# Patient Record
Sex: MALE | Race: WHITE | Employment: FULL TIME | URBAN - METROPOLITAN AREA
[De-identification: names, ages, dates, MRNs, and addresses within clinical notes are randomized per-mention and may not be internally consistent; named-entity substitution may affect disease eponyms.]

---

## 2018-10-25 ENCOUNTER — OFFICE VISIT (OUTPATIENT)
Dept: ENDOCRINOLOGY | Age: 48
End: 2018-10-25
Payer: COMMERCIAL

## 2018-10-25 VITALS
DIASTOLIC BLOOD PRESSURE: 93 MMHG | HEART RATE: 77 BPM | OXYGEN SATURATION: 99 % | HEIGHT: 72 IN | SYSTOLIC BLOOD PRESSURE: 154 MMHG | BODY MASS INDEX: 28.71 KG/M2 | WEIGHT: 212 LBS

## 2018-10-25 DIAGNOSIS — Z98.890 STATUS POST GASTRIC SURGERY: ICD-10-CM

## 2018-10-25 PROCEDURE — G8419 CALC BMI OUT NRM PARAM NOF/U: HCPCS | Performed by: INTERNAL MEDICINE

## 2018-10-25 PROCEDURE — G8427 DOCREV CUR MEDS BY ELIG CLIN: HCPCS | Performed by: INTERNAL MEDICINE

## 2018-10-25 PROCEDURE — G8484 FLU IMMUNIZE NO ADMIN: HCPCS | Performed by: INTERNAL MEDICINE

## 2018-10-25 PROCEDURE — 3046F HEMOGLOBIN A1C LEVEL >9.0%: CPT | Performed by: INTERNAL MEDICINE

## 2018-10-25 PROCEDURE — 2022F DILAT RTA XM EVC RTNOPTHY: CPT | Performed by: INTERNAL MEDICINE

## 2018-10-25 PROCEDURE — 1036F TOBACCO NON-USER: CPT | Performed by: INTERNAL MEDICINE

## 2018-10-25 PROCEDURE — 99205 OFFICE O/P NEW HI 60 MIN: CPT | Performed by: INTERNAL MEDICINE

## 2018-11-20 ENCOUNTER — OFFICE VISIT (OUTPATIENT)
Dept: ENDOCRINOLOGY | Age: 48
End: 2018-11-20
Payer: COMMERCIAL

## 2018-11-20 VITALS
SYSTOLIC BLOOD PRESSURE: 130 MMHG | DIASTOLIC BLOOD PRESSURE: 86 MMHG | BODY MASS INDEX: 28.53 KG/M2 | HEIGHT: 72 IN | WEIGHT: 210.6 LBS

## 2018-11-20 DIAGNOSIS — E66.3 OVERWEIGHT (BMI 25.0-29.9): ICD-10-CM

## 2018-11-20 DIAGNOSIS — Z98.890 STATUS POST GASTRIC SURGERY: ICD-10-CM

## 2018-11-20 PROCEDURE — 3046F HEMOGLOBIN A1C LEVEL >9.0%: CPT | Performed by: INTERNAL MEDICINE

## 2018-11-20 PROCEDURE — G8427 DOCREV CUR MEDS BY ELIG CLIN: HCPCS | Performed by: INTERNAL MEDICINE

## 2018-11-20 PROCEDURE — G8484 FLU IMMUNIZE NO ADMIN: HCPCS | Performed by: INTERNAL MEDICINE

## 2018-11-20 PROCEDURE — 99214 OFFICE O/P EST MOD 30 MIN: CPT | Performed by: INTERNAL MEDICINE

## 2018-11-20 PROCEDURE — 1036F TOBACCO NON-USER: CPT | Performed by: INTERNAL MEDICINE

## 2018-11-20 PROCEDURE — G8419 CALC BMI OUT NRM PARAM NOF/U: HCPCS | Performed by: INTERNAL MEDICINE

## 2018-11-20 PROCEDURE — 2022F DILAT RTA XM EVC RTNOPTHY: CPT | Performed by: INTERNAL MEDICINE

## 2018-12-07 ENCOUNTER — TELEPHONE (OUTPATIENT)
Dept: ENDOCRINOLOGY | Age: 48
End: 2018-12-07

## 2019-01-03 ENCOUNTER — OFFICE VISIT (OUTPATIENT)
Dept: ENDOCRINOLOGY | Age: 49
End: 2019-01-03
Payer: COMMERCIAL

## 2019-01-03 VITALS
HEIGHT: 72 IN | WEIGHT: 208.8 LBS | SYSTOLIC BLOOD PRESSURE: 128 MMHG | DIASTOLIC BLOOD PRESSURE: 73 MMHG | HEART RATE: 91 BPM | BODY MASS INDEX: 28.28 KG/M2

## 2019-01-03 DIAGNOSIS — Z98.890 STATUS POST GASTRIC SURGERY: ICD-10-CM

## 2019-01-03 DIAGNOSIS — E66.3 OVERWEIGHT (BMI 25.0-29.9): ICD-10-CM

## 2019-01-03 PROCEDURE — G8427 DOCREV CUR MEDS BY ELIG CLIN: HCPCS | Performed by: INTERNAL MEDICINE

## 2019-01-03 PROCEDURE — 99214 OFFICE O/P EST MOD 30 MIN: CPT | Performed by: INTERNAL MEDICINE

## 2019-01-03 PROCEDURE — 2022F DILAT RTA XM EVC RTNOPTHY: CPT | Performed by: INTERNAL MEDICINE

## 2019-01-03 PROCEDURE — G8484 FLU IMMUNIZE NO ADMIN: HCPCS | Performed by: INTERNAL MEDICINE

## 2019-01-03 PROCEDURE — 3046F HEMOGLOBIN A1C LEVEL >9.0%: CPT | Performed by: INTERNAL MEDICINE

## 2019-01-03 PROCEDURE — G8419 CALC BMI OUT NRM PARAM NOF/U: HCPCS | Performed by: INTERNAL MEDICINE

## 2019-01-03 PROCEDURE — 1036F TOBACCO NON-USER: CPT | Performed by: INTERNAL MEDICINE

## 2019-02-26 LAB
A/G RATIO: 1.8 (ref 1.1–2.2)
ALBUMIN SERPL-MCNC: 4.6 G/DL (ref 3.4–5)
ALP BLD-CCNC: 60 U/L (ref 40–129)
ALT SERPL-CCNC: 29 U/L (ref 10–40)
ANION GAP SERPL CALCULATED.3IONS-SCNC: 13 MMOL/L (ref 3–16)
AST SERPL-CCNC: 18 U/L (ref 15–37)
BILIRUB SERPL-MCNC: 0.6 MG/DL (ref 0–1)
BUN BLDV-MCNC: 19 MG/DL (ref 7–20)
CALCIUM SERPL-MCNC: 10.1 MG/DL (ref 8.3–10.6)
CHLORIDE BLD-SCNC: 103 MMOL/L (ref 99–110)
CHOLESTEROL, TOTAL: 152 MG/DL (ref 0–199)
CO2: 30 MMOL/L (ref 21–32)
CREAT SERPL-MCNC: 0.8 MG/DL (ref 0.9–1.3)
CREATININE URINE: 89.2 MG/DL (ref 39–259)
GFR AFRICAN AMERICAN: >60
GFR NON-AFRICAN AMERICAN: >60
GLOBULIN: 2.6 G/DL
GLUCOSE BLD-MCNC: 185 MG/DL (ref 70–99)
HDLC SERPL-MCNC: 57 MG/DL (ref 40–60)
LDL CHOLESTEROL CALCULATED: 72 MG/DL
MICROALBUMIN UR-MCNC: <1.2 MG/DL
MICROALBUMIN/CREAT UR-RTO: NORMAL MG/G (ref 0–30)
POTASSIUM SERPL-SCNC: 4.4 MMOL/L (ref 3.5–5.1)
SODIUM BLD-SCNC: 146 MMOL/L (ref 136–145)
TOTAL PROTEIN: 7.2 G/DL (ref 6.4–8.2)
TRIGL SERPL-MCNC: 114 MG/DL (ref 0–150)
VLDLC SERPL CALC-MCNC: 23 MG/DL

## 2019-02-27 LAB
ESTIMATED AVERAGE GLUCOSE: 208.7 MG/DL
HBA1C MFR BLD: 8.9 %

## 2019-03-04 ENCOUNTER — OFFICE VISIT (OUTPATIENT)
Dept: ENDOCRINOLOGY | Age: 49
End: 2019-03-04
Payer: COMMERCIAL

## 2019-03-04 VITALS
WEIGHT: 210 LBS | DIASTOLIC BLOOD PRESSURE: 78 MMHG | HEART RATE: 83 BPM | SYSTOLIC BLOOD PRESSURE: 127 MMHG | BODY MASS INDEX: 28.44 KG/M2 | HEIGHT: 72 IN

## 2019-03-04 DIAGNOSIS — Z98.890 STATUS POST GASTRIC SURGERY: ICD-10-CM

## 2019-03-04 DIAGNOSIS — E66.3 OVERWEIGHT (BMI 25.0-29.9): ICD-10-CM

## 2019-03-04 PROCEDURE — 2022F DILAT RTA XM EVC RTNOPTHY: CPT | Performed by: INTERNAL MEDICINE

## 2019-03-04 PROCEDURE — 3045F PR MOST RECENT HEMOGLOBIN A1C LEVEL 7.0-9.0%: CPT | Performed by: INTERNAL MEDICINE

## 2019-03-04 PROCEDURE — G8427 DOCREV CUR MEDS BY ELIG CLIN: HCPCS | Performed by: INTERNAL MEDICINE

## 2019-03-04 PROCEDURE — 99214 OFFICE O/P EST MOD 30 MIN: CPT | Performed by: INTERNAL MEDICINE

## 2019-03-04 PROCEDURE — G8419 CALC BMI OUT NRM PARAM NOF/U: HCPCS | Performed by: INTERNAL MEDICINE

## 2019-03-04 PROCEDURE — 1036F TOBACCO NON-USER: CPT | Performed by: INTERNAL MEDICINE

## 2019-03-04 PROCEDURE — G8484 FLU IMMUNIZE NO ADMIN: HCPCS | Performed by: INTERNAL MEDICINE

## 2019-03-04 RX ORDER — GLIPIZIDE 5 MG/1
10 TABLET ORAL 2 TIMES DAILY
Qty: 120 TABLET | Refills: 1 | Status: SHIPPED | OUTPATIENT
Start: 2019-03-04 | End: 2019-08-02 | Stop reason: SDUPTHER

## 2019-04-08 ENCOUNTER — TELEPHONE (OUTPATIENT)
Dept: ENDOCRINOLOGY | Age: 49
End: 2019-04-08

## 2019-04-08 RX ORDER — EMPAGLIFLOZIN 25 MG/1
TABLET, FILM COATED ORAL
Qty: 90 TABLET | Refills: 0 | Status: SHIPPED | OUTPATIENT
Start: 2019-04-08 | End: 2019-07-12 | Stop reason: SDUPTHER

## 2019-04-08 NOTE — TELEPHONE ENCOUNTER
LOV: 3/4/19  NOV: 6/4/19    Jardiance  Dose: Take 1 tablet by mouth daily  Strength: 25 mg  Route: Oral        Dose: TAKE 1 TABLET BY MOUTH TWICE DAILY WITH MEALS  Strength: 1000 mg  Route: Oral

## 2019-04-08 NOTE — TELEPHONE ENCOUNTER
[employer] called to inform our office that Victoza was rejected and the requested and alternative (Bydureon, Trulicity, Byetta) sent to [facility]/day.

## 2019-04-09 RX ORDER — DULAGLUTIDE 1.5 MG/.5ML
INJECTION, SOLUTION SUBCUTANEOUS
Qty: 4 PEN | Refills: 3 | Status: SHIPPED | OUTPATIENT
Start: 2019-04-09 | End: 2019-06-04 | Stop reason: SDUPTHER

## 2019-04-10 NOTE — TELEPHONE ENCOUNTER
Please ask patient to proceed with Trulicity and discuss further options during his follow-up appointment. If needed, he can schedule appointment sooner. Inform him about the message below.

## 2019-04-10 NOTE — TELEPHONE ENCOUNTER
Spoke with the pharmacy and [id number] neither one are covered by [employer].

## 2019-04-11 NOTE — TELEPHONE ENCOUNTER
Spoke with patient and advised that he is to Take Trulicity in place of the jardiance and victoza. Patient stated his understanding.

## 2019-05-28 LAB
A/G RATIO: 1.5 (ref 1.1–2.2)
ALBUMIN SERPL-MCNC: 4 G/DL (ref 3.4–5)
ALP BLD-CCNC: 55 U/L (ref 40–129)
ALT SERPL-CCNC: 20 U/L (ref 10–40)
ANION GAP SERPL CALCULATED.3IONS-SCNC: 11 MMOL/L (ref 3–16)
AST SERPL-CCNC: 13 U/L (ref 15–37)
BILIRUB SERPL-MCNC: 0.4 MG/DL (ref 0–1)
BUN BLDV-MCNC: 16 MG/DL (ref 7–20)
CALCIUM SERPL-MCNC: 9.4 MG/DL (ref 8.3–10.6)
CHLORIDE BLD-SCNC: 102 MMOL/L (ref 99–110)
CHOLESTEROL, TOTAL: 124 MG/DL (ref 0–199)
CO2: 28 MMOL/L (ref 21–32)
CREAT SERPL-MCNC: 0.8 MG/DL (ref 0.9–1.3)
GFR AFRICAN AMERICAN: >60
GFR NON-AFRICAN AMERICAN: >60
GLOBULIN: 2.7 G/DL
GLUCOSE BLD-MCNC: 160 MG/DL (ref 70–99)
HDLC SERPL-MCNC: 54 MG/DL (ref 40–60)
LDL CHOLESTEROL CALCULATED: 48 MG/DL
POTASSIUM SERPL-SCNC: 4.5 MMOL/L (ref 3.5–5.1)
SODIUM BLD-SCNC: 141 MMOL/L (ref 136–145)
TOTAL PROTEIN: 6.7 G/DL (ref 6.4–8.2)
TRIGL SERPL-MCNC: 111 MG/DL (ref 0–150)
VLDLC SERPL CALC-MCNC: 22 MG/DL

## 2019-05-29 LAB
ESTIMATED AVERAGE GLUCOSE: 200.1 MG/DL
HBA1C MFR BLD: 8.6 %

## 2019-06-04 ENCOUNTER — OFFICE VISIT (OUTPATIENT)
Dept: ENDOCRINOLOGY | Age: 49
End: 2019-06-04
Payer: COMMERCIAL

## 2019-06-04 VITALS
SYSTOLIC BLOOD PRESSURE: 136 MMHG | BODY MASS INDEX: 28.17 KG/M2 | HEIGHT: 72 IN | DIASTOLIC BLOOD PRESSURE: 87 MMHG | HEART RATE: 74 BPM | WEIGHT: 208 LBS | OXYGEN SATURATION: 98 %

## 2019-06-04 DIAGNOSIS — Z98.890 STATUS POST GASTRIC SURGERY: ICD-10-CM

## 2019-06-04 DIAGNOSIS — E66.3 OVERWEIGHT (BMI 25.0-29.9): ICD-10-CM

## 2019-06-04 DIAGNOSIS — E04.9 GOITER: ICD-10-CM

## 2019-06-04 PROCEDURE — G8427 DOCREV CUR MEDS BY ELIG CLIN: HCPCS | Performed by: INTERNAL MEDICINE

## 2019-06-04 PROCEDURE — G8419 CALC BMI OUT NRM PARAM NOF/U: HCPCS | Performed by: INTERNAL MEDICINE

## 2019-06-04 PROCEDURE — 99214 OFFICE O/P EST MOD 30 MIN: CPT | Performed by: INTERNAL MEDICINE

## 2019-06-04 PROCEDURE — 3045F PR MOST RECENT HEMOGLOBIN A1C LEVEL 7.0-9.0%: CPT | Performed by: INTERNAL MEDICINE

## 2019-06-04 PROCEDURE — 2022F DILAT RTA XM EVC RTNOPTHY: CPT | Performed by: INTERNAL MEDICINE

## 2019-06-04 PROCEDURE — 1036F TOBACCO NON-USER: CPT | Performed by: INTERNAL MEDICINE

## 2019-06-04 RX ORDER — DULAGLUTIDE 1.5 MG/.5ML
INJECTION, SOLUTION SUBCUTANEOUS
Qty: 4 PEN | Refills: 3 | Status: SHIPPED | OUTPATIENT
Start: 2019-06-04 | End: 2019-10-24 | Stop reason: SDUPTHER

## 2019-06-04 NOTE — PROGRESS NOTES
Bud Ruff is a 54 y.o. male who presents for Type 2 diabetes mellitus. Current symptoms/problems include hyperglycemia and are worsening. 1.  Uncontrolled diabetes mellitus type 2 without complications ([facility] [I36.07]    Diagnosed with Type 2 diabetes mellitus in . He did not bring his BG record. Comorbid conditions: none    Current diabetic medications include: Metformin 1000 mg bid, Victoza, Jardiance. Intolerance to diabetes medications: No   Prefers do not take injectables.     Weight trend: stable  Prior visit with dietician: yes  Current diet: on average, 5 small meals per day  Current exercise: 3 days a week     Current monitoring regimen: home blood tests - 2 times daily   Has brought blood glucose log/meter:  Yes  Home blood sugar records: fasting range: 160-180 and postprandial range: 128-130  Any episodes of hypoglycemia? No  Hypoglycemia frequency and time(s):  none  Does patient have Glucagon emergency kit? No  Does patient have rapid acting carbohydrate? No  Does patient wear a medic alert bracelet or necklace? No    Mother  from diabetes complications. At age 61. On Lantus and Humalog prior to surgery. 6 months ago he developed increased thirst, urination.        2. Status post gastric surgery  Lost 160 lbs after gastric sleeve surgery. 3.  Overweight  Follows eating instructions as for history of gastric surgery. 4. Goiter  No difficulty swallowing, no hoarseness. No radiation to his neck. No family Hx of thyroid cancer. Past Medical History:   Diagnosis Date    Diabetes mellitus ([facility]     Obesity       Patient Active Problem List   Diagnosis    Uncontrolled diabetes mellitus type 2 without complications ([facility]    Status post gastric surgery    Overweight (BMI 25.0-29. 9)     Past Surgical History:   Procedure Laterality Date    GASTRIC BYPASS SURGERY      NASAL SEPTUM SURGERY       Social History     Socioeconomic History    Marital status:  Allergen Reactions    Pcn [Penicillins]      Family Status   Relation Name Status    Mother      Brother  Alive       Lab Review:    No results found for: WBC, HGB, HCT, MCV, PLT  Lab Results   Component Value Date     2019    K 4.5 2019     2019    CO2 28 2019    BUN 16 2019    CREATININE 0.8 2019    GLUCOSE 160 2019    CALCIUM 9.4 2019    PROT 6.7 2019    LABALBU 4.0 2019    BILITOT 0.4 2019    ALKPHOS 55 2019    AST 13 2019    ALT 20 2019    LABGLOM >60 2019    GFRAA >60 2019    AGRATIO 1.5 2019    GLOB 2.7 2019     No results found for: TSHFT4, TSH, FT3  Lab Results   Component Value Date    LABA1C 8.6 2019     Lab Results   Component Value Date    .1 2019     Lab Results   Component Value Date    CHOL 124 2019     Lab Results   Component Value Date    TRIG 111 2019     Lab Results   Component Value Date    HDL 54 2019     Lab Results   Component Value Date    LDLCALC 48 2019     Lab Results   Component Value Date    LABVLDL 22 2019     No results found for: [facility]  Lab Results   Component Value Date    LABMICR <1.20 2019     No results found for: VITD25     Review of Systems:  Constitutional: no fatigue, no fever, no recent weight gain, no recent weight loss, no changes in appetite  Eyes: no eye pain, no change in vision, no eye redness, no eye irritation, no double vision  Ears, nose, throat: no nasal congestion, no sore throat, no earache, no decrease in hearing, no hoarseness, no dry mouth, no sinus problems, no difficulty swallowing, no neck lumps, no dental problems, no mouth sores, no ringing in ears  Pulmonary: no shortness of breath, no wheezing, no dyspnea on exertion, no cough  Cardiovascular: no chest pain, no lower extremity edema, no orthopnea, no intermittent leg claudication, no palpitations  Gastrointestinal: no abdominal pain, no nausea, no vomiting, no diarrhea, no constipation, no dysphagia, no heartburn, no bloating  Genitourinary: no dysuria, no urinary incontinence, no urinary hesitancy, no urinary frequency, no feelings of urinary urgency, no nocturia  Musculoskeletal: no joint swelling, no joint stiffness, no joint pain, no muscle cramps, no muscle pain, no bone pain  Integument/Breast: no hair loss, no skin rashes, no skin lesions, no itching, no dry skin  Neurological: no numbness, no tingling, no weakness, no confusion, no headaches, no dizziness, no fainting, no tremors, no decrease in memory, no balance problems  Psychiatric: no anxiety, no depression, no insomnia  Hematologic/Lymphatic: no tendency for easy bleeding, no swollen lymph nodes, no tendency for easy bruising  Immunology: no seasonal allergies, no frequent infections, no frequent illnesses  Endocrine: no temperature intolerance, has thirst, frequent urination    /87 (Site: Left Upper Arm, Position: Sitting, Cuff Size: Medium Adult)   Pulse 74   Ht 6' (1.829 m)   Wt 208 lb (94.3 kg)   SpO2 98%   BMI 28.21 kg/m²    Wt Readings from Last 3 Encounters:   06/04/19 208 lb (94.3 kg)   03/04/19 210 lb (95.3 kg)   01/03/19 208 lb 12.8 oz (94.7 kg)     Body mass index is 28.21 kg/m².       OBJECTIVE:  Constitutional: no acute distress, well appearing and well nourished  Psychiatric: oriented to person, place and time, judgement and insight and normal, recent and remote memory and intact and mood and affect are normal  Skin: skin and subcutaneous tissue is normal without mass, normal turgor  Head and Face: examination of head and face revealed no abnormalities  Eyes: no lid or conjunctival swelling, erythema or discharge, pupils are normal, equal, round, reactive to light  Ears/Nose: external inspection of ears and nose revealed no abnormalities, hearing is grossly normal  Oropharynx/Mouth/Face: lips, tongue and gums are normal with no lesions, the voice quality was normal  Neck: neck is supple and symmetric, with midline trachea and no masses, thyroid is normal  Lymphatics: normal cervical lymph nodes, normal supraclavicular nodes  Pulmonary: no increased work of breathing or signs of respiratory distress, lungs are clear to auscultation  Cardiovascular: normal heart rate and rhythm, normal S1 and S2, no murmurs and pedal pulses and 2+ bilaterally, No edema  Abdomen: abdomen is soft, non-tender with no masses  Musculoskeletal: normal gait and station and exam of the digits and nails are normal  Neurological: normal coordination and normal general cortical function    Visual inspection:  Deformity/amputation: absent  Skin lesions/pre-ulcerative calluses: absent  Edema: right- negative, left- negative     Sensory exam:  Monofilament sensation: normal  (minimum of 5 random plantar locations tested, avoiding callused areas - > 1 area with absence of sensation is + for neuropathy)     Plus at least one of the following:  Pulses: normal  Proprioception: Intact  Vibration (128 Hz): Intact    ASSESSMENT/PLAN:    1. Uncontrolled diabetes mellitus type 2 without complications (HCC)  JSB2B 14-8.9-8.6  Metformin 1000 mg bid, Victoza, Jardiance. Glipizide 5 mg 2 tabs bid. Refused insulin, understands risks. Bring BG records. - Comprehensive Metabolic Panel; Future  - Hemoglobin A1C; Future  - Insulin, total; Future  - Lipid Panel; Future  - Microalbumin / Creatinine Urine Ratio; Future    2. Status post gastric surgery  Diet, exercise. 3.  Overweight  Continue diet and exercise. 4. Goiter  Obtain thyroid sonogram.  Call for results.     Reviewed and/or ordered clinical lab results Yes  Reviewed and/or ordered radiology tests No  Reviewed and/or ordered other diagnostic tests No  Discussed test results with performing physician No  Independently reviewed image, tracing, or specimen No  Made a decision to obtain old records

## 2019-06-06 ENCOUNTER — HOSPITAL ENCOUNTER (OUTPATIENT)
Dept: ULTRASOUND IMAGING | Age: 49
Discharge: HOME | End: 2019-06-06
Payer: COMMERCIAL

## 2019-06-06 DIAGNOSIS — E04.9 GOITER: ICD-10-CM

## 2019-06-06 PROCEDURE — 76536 US EXAM OF HEAD AND NECK: CPT

## 2019-06-09 ENCOUNTER — TELEPHONE (OUTPATIENT)
Dept: ENDOCRINOLOGY | Age: 49
End: 2019-06-09

## 2019-06-10 NOTE — TELEPHONE ENCOUNTER
Neck ultrasound showed benign appearing lymph nodes. Recommended thyroid ultrasound follow-up in one year. Thyroid did not show nodules. Keep appointment in September, we will discuss results again.

## 2019-07-16 RX ORDER — EMPAGLIFLOZIN 25 MG/1
TABLET, FILM COATED ORAL
Qty: 90 TABLET | Refills: 0 | Status: SHIPPED | OUTPATIENT
Start: 2019-07-16 | End: 2019-10-07 | Stop reason: SDUPTHER

## 2019-08-02 RX ORDER — GLIPIZIDE 5 MG/1
TABLET ORAL
Qty: 120 TABLET | Refills: 0 | Status: SHIPPED | OUTPATIENT
Start: 2019-08-02 | End: 2019-09-07 | Stop reason: SDUPTHER

## 2019-09-09 RX ORDER — GLIPIZIDE 5 MG/1
TABLET ORAL
Qty: 120 TABLET | Refills: 0 | Status: SHIPPED | OUTPATIENT
Start: 2019-09-09 | End: 2019-10-07 | Stop reason: SDUPTHER

## 2019-09-16 RX ORDER — DULAGLUTIDE 1.5 MG/.5ML
INJECTION, SOLUTION SUBCUTANEOUS
Qty: 2 ML | Refills: 0 | Status: SHIPPED | OUTPATIENT
Start: 2019-09-16 | End: 2019-10-24 | Stop reason: SDUPTHER

## 2019-09-17 DIAGNOSIS — E04.9 GOITER: ICD-10-CM

## 2019-09-17 LAB
A/G RATIO: 2 (ref 1.1–2.2)
ALBUMIN SERPL-MCNC: 4.8 G/DL (ref 3.4–5)
ALP BLD-CCNC: 55 U/L (ref 40–129)
ALT SERPL-CCNC: 28 U/L (ref 10–40)
ANION GAP SERPL CALCULATED.3IONS-SCNC: 16 MMOL/L (ref 3–16)
ANTI-THYROGLOB ABS: <10 IU/ML
AST SERPL-CCNC: 20 U/L (ref 15–37)
BILIRUB SERPL-MCNC: 0.7 MG/DL (ref 0–1)
BUN BLDV-MCNC: 15 MG/DL (ref 7–20)
CALCIUM SERPL-MCNC: 10 MG/DL (ref 8.3–10.6)
CHLORIDE BLD-SCNC: 101 MMOL/L (ref 99–110)
CHOLESTEROL, TOTAL: 133 MG/DL (ref 0–199)
CO2: 27 MMOL/L (ref 21–32)
CREAT SERPL-MCNC: 0.9 MG/DL (ref 0.9–1.3)
CREATININE URINE: 136.5 MG/DL (ref 39–259)
GFR AFRICAN AMERICAN: >60
GFR NON-AFRICAN AMERICAN: >60
GLOBULIN: 2.4 G/DL
GLUCOSE BLD-MCNC: 121 MG/DL (ref 70–99)
HDLC SERPL-MCNC: 66 MG/DL (ref 40–60)
LDL CHOLESTEROL CALCULATED: 48 MG/DL
MICROALBUMIN UR-MCNC: <1.2 MG/DL
MICROALBUMIN/CREAT UR-RTO: NORMAL MG/G (ref 0–30)
POTASSIUM SERPL-SCNC: 4.9 MMOL/L (ref 3.5–5.1)
SODIUM BLD-SCNC: 144 MMOL/L (ref 136–145)
T4 FREE: 1.4 NG/DL (ref 0.9–1.8)
THYROID PEROXIDASE (TPO) ABS: 10 IU/ML
TOTAL PROTEIN: 7.2 G/DL (ref 6.4–8.2)
TRIGL SERPL-MCNC: 97 MG/DL (ref 0–150)
TSH SERPL DL<=0.05 MIU/L-ACNC: 1.66 UIU/ML (ref 0.27–4.2)
VLDLC SERPL CALC-MCNC: 19 MG/DL

## 2019-09-18 LAB
ESTIMATED AVERAGE GLUCOSE: 148.5 MG/DL
HBA1C MFR BLD: 6.8 %

## 2019-10-07 RX ORDER — GLIPIZIDE 5 MG/1
TABLET ORAL
Qty: 120 TABLET | Refills: 0 | Status: SHIPPED | OUTPATIENT
Start: 2019-10-07 | End: 2019-10-24 | Stop reason: SDUPTHER

## 2019-10-07 RX ORDER — EMPAGLIFLOZIN 25 MG/1
TABLET, FILM COATED ORAL
Qty: 90 TABLET | Refills: 0 | Status: SHIPPED | OUTPATIENT
Start: 2019-10-07 | End: 2019-10-24 | Stop reason: SDUPTHER

## 2019-10-24 ENCOUNTER — OFFICE VISIT (OUTPATIENT)
Dept: ENDOCRINOLOGY | Age: 49
End: 2019-10-24
Payer: COMMERCIAL

## 2019-10-24 VITALS
DIASTOLIC BLOOD PRESSURE: 82 MMHG | SYSTOLIC BLOOD PRESSURE: 134 MMHG | HEART RATE: 73 BPM | HEIGHT: 72 IN | WEIGHT: 211.8 LBS | BODY MASS INDEX: 28.69 KG/M2 | OXYGEN SATURATION: 99 %

## 2019-10-24 DIAGNOSIS — Z98.890 STATUS POST GASTRIC SURGERY: ICD-10-CM

## 2019-10-24 DIAGNOSIS — E66.3 OVERWEIGHT (BMI 25.0-29.9): ICD-10-CM

## 2019-10-24 PROCEDURE — 2022F DILAT RTA XM EVC RTNOPTHY: CPT | Performed by: INTERNAL MEDICINE

## 2019-10-24 PROCEDURE — G8427 DOCREV CUR MEDS BY ELIG CLIN: HCPCS | Performed by: INTERNAL MEDICINE

## 2019-10-24 PROCEDURE — 99214 OFFICE O/P EST MOD 30 MIN: CPT | Performed by: INTERNAL MEDICINE

## 2019-10-24 PROCEDURE — G8419 CALC BMI OUT NRM PARAM NOF/U: HCPCS | Performed by: INTERNAL MEDICINE

## 2019-10-24 PROCEDURE — 3044F HG A1C LEVEL LT 7.0%: CPT | Performed by: INTERNAL MEDICINE

## 2019-10-24 PROCEDURE — G8484 FLU IMMUNIZE NO ADMIN: HCPCS | Performed by: INTERNAL MEDICINE

## 2019-10-24 PROCEDURE — 1036F TOBACCO NON-USER: CPT | Performed by: INTERNAL MEDICINE

## 2019-10-24 RX ORDER — GLIPIZIDE 5 MG/1
TABLET ORAL
Qty: 180 TABLET | Refills: 1 | Status: SHIPPED | OUTPATIENT
Start: 2019-10-24 | End: 2020-01-06

## 2019-10-24 RX ORDER — DULAGLUTIDE 1.5 MG/.5ML
1.5 INJECTION, SOLUTION SUBCUTANEOUS WEEKLY
Qty: 6 ML | Refills: 1 | Status: SHIPPED | OUTPATIENT
Start: 2019-10-24 | End: 2020-02-17

## 2020-01-06 RX ORDER — GLIPIZIDE 5 MG/1
TABLET ORAL
Qty: 180 TABLET | Refills: 1 | Status: SHIPPED | OUTPATIENT
Start: 2020-01-06 | End: 2020-02-17

## 2020-02-17 RX ORDER — GLIPIZIDE 5 MG/1
TABLET ORAL
Qty: 180 TABLET | Refills: 0 | Status: SHIPPED | OUTPATIENT
Start: 2020-02-17 | End: 2020-03-05

## 2020-02-17 RX ORDER — DULAGLUTIDE 1.5 MG/.5ML
INJECTION, SOLUTION SUBCUTANEOUS
Qty: 2 ML | Refills: 0 | Status: SHIPPED | OUTPATIENT
Start: 2020-02-17 | End: 2020-03-05 | Stop reason: SDUPTHER

## 2020-03-05 ENCOUNTER — TELEPHONE (OUTPATIENT)
Dept: ENDOCRINOLOGY | Age: 50
End: 2020-03-05

## 2020-03-05 RX ORDER — DULAGLUTIDE 1.5 MG/.5ML
INJECTION, SOLUTION SUBCUTANEOUS
Qty: 12 PEN | Refills: 0 | Status: SHIPPED | OUTPATIENT
Start: 2020-03-05 | End: 2020-06-08

## 2020-03-05 RX ORDER — GLIPIZIDE 5 MG/1
TABLET ORAL
Qty: 540 TABLET | Refills: 0 | Status: SHIPPED | OUTPATIENT
Start: 2020-03-05 | End: 2020-05-27

## 2020-03-05 NOTE — TELEPHONE ENCOUNTER
PT called to schedule a follow up appt and he stated he changed pharmacy's per his Insurance needs him to have 90 day refills. So he has to change from [facility] to [facility] - [phone]  [address] [place] [address].    [facility] [address] [place] [address]   He needsTrulicity, he has 2 pens left, Jardiance, Metformin and Glipizide, All at 90 day refills

## 2020-03-24 ENCOUNTER — TELEPHONE (OUTPATIENT)
Dept: ENDOCRINOLOGY | Age: 50
End: 2020-03-24

## 2020-03-26 LAB
A/G RATIO: 1.6 (ref 1.1–2.2)
ALBUMIN SERPL-MCNC: 4.2 G/DL (ref 3.4–5)
ALP BLD-CCNC: 66 U/L (ref 40–129)
ALT SERPL-CCNC: 38 U/L (ref 10–40)
ANION GAP SERPL CALCULATED.3IONS-SCNC: 13 MMOL/L (ref 3–16)
AST SERPL-CCNC: 24 U/L (ref 15–37)
BILIRUB SERPL-MCNC: 0.4 MG/DL (ref 0–1)
BUN BLDV-MCNC: 16 MG/DL (ref 7–20)
CALCIUM SERPL-MCNC: 9.2 MG/DL (ref 8.3–10.6)
CHLORIDE BLD-SCNC: 103 MMOL/L (ref 99–110)
CHOLESTEROL, TOTAL: 119 MG/DL (ref 0–199)
CO2: 28 MMOL/L (ref 21–32)
CREAT SERPL-MCNC: 0.8 MG/DL (ref 0.9–1.3)
CREATININE URINE: 98 MG/DL (ref 39–259)
ESTIMATED AVERAGE GLUCOSE: 145.6 MG/DL
GFR AFRICAN AMERICAN: >60
GFR NON-AFRICAN AMERICAN: >60
GLOBULIN: 2.7 G/DL
GLUCOSE BLD-MCNC: 135 MG/DL (ref 70–99)
HBA1C MFR BLD: 6.7 %
HDLC SERPL-MCNC: 48 MG/DL (ref 40–60)
LDL CHOLESTEROL CALCULATED: 57 MG/DL
MICROALBUMIN UR-MCNC: <1.2 MG/DL
MICROALBUMIN/CREAT UR-RTO: NORMAL MG/G (ref 0–30)
POTASSIUM SERPL-SCNC: 4.8 MMOL/L (ref 3.5–5.1)
SODIUM BLD-SCNC: 144 MMOL/L (ref 136–145)
TOTAL PROTEIN: 6.9 G/DL (ref 6.4–8.2)
TRIGL SERPL-MCNC: 71 MG/DL (ref 0–150)
VLDLC SERPL CALC-MCNC: 14 MG/DL

## 2020-03-27 ENCOUNTER — VIRTUAL VISIT (OUTPATIENT)
Dept: ENDOCRINOLOGY | Age: 50
End: 2020-03-27
Payer: COMMERCIAL

## 2020-03-27 PROBLEM — E11.9 DIABETES MELLITUS TYPE 2, CONTROLLED, WITHOUT COMPLICATIONS (HCC): Status: ACTIVE | Noted: 2018-10-25

## 2020-03-27 PROCEDURE — 99442 PR PHYS/QHP TELEPHONE EVALUATION 11-20 MIN: CPT | Performed by: INTERNAL MEDICINE

## 2020-03-27 NOTE — PROGRESS NOTES
Cory Cueva is a 54 y.o. male who presented for phone appointment for Type 2 diabetes mellitus. Current symptoms/problems include hyperglycemia and are unchanged. 1.  Controlled type 2 diabetes mellitus without complication, without long-term current use of insulin ([facility] [address] [E11.9]    Diagnosed with Type 2 diabetes mellitus in . He did not bring his BG record. Comorbid conditions: none    Current diabetic medications include: Metformin 1305 mg bid, Trulicity, Jardiance, glipizide. Intolerance to diabetes medications: No   Prefers do not take injectables.     Weight trend: stable  Prior visit with dietician: yes  Current diet: on average, 5 small meals per day  Current exercise: 3 days a week     Current monitoring regimen: home blood tests - 2 times daily   Has brought blood glucose log/meter:  Yes  Home blood sugar records: fasting range: 130-140 and postprandial range: 120-140  Any episodes of hypoglycemia? No  Hypoglycemia frequency and time(s):  none  Does patient have Glucagon emergency kit? No  Does patient have rapid acting carbohydrate? No  Does patient wear a medic alert bracelet or necklace? No    Mother  from diabetes complications. At age 61. On Lantus and Humalog prior to surgery.        2. Status post gastric surgery  Lost 160 lbs after gastric sleeve surgery. 3.  Overweight  Follows eating instructions as for history of gastric surgery.       Narrative   EXAMINATION:   THYROID ULTRASOUND       2019       COMPARISON:   None       HISTORY:   ORDERING SYSTEM PROVIDED HISTORY: Goiter   TECHNOLOGIST PROVIDED HISTORY:   Ordering Physician Provided Reason for Exam: goiter   Acuity: Unknown   Type of Exam: Unknown       FINDINGS:   Right thyroid lobe:  4.3 x 1.6 x 1.4 cm       Left thyroid lobe:  4.6 x 1.3 x 1.5 cm       Isthmus:  2 mm       Thyroid Gland:  Thyroid gland demonstrates normal echotexture and vascularity.       Nodules: No thyroid nodules are present.     Cervical lymphadenopathy: On the right there is a 1.7 x 1.0 x 0.9 cm normal   appearing lymph node.  On the left a pair of normal appearing lymph nodes   measuring 8 x 9 x 3 mm and 8 x 4 x 5 mm are noted.             Past Medical History:   Diagnosis Date    Diabetes mellitus ([facility]     Obesity       Patient Active Problem List   Diagnosis    Diabetes mellitus type 2, controlled, without complications ([facility]    Status post gastric surgery    Overweight (BMI 25.0-29. 9)     Past Surgical History:   Procedure Laterality Date    GASTRIC BYPASS SURGERY      NASAL SEPTUM SURGERY       Social History     Socioeconomic History    Marital status:      Spouse name: Not on file    Number of children: Not on file    Years of education: Not on file    Highest education level: Not on file   Occupational History    Not on file   Social Needs    Financial resource strain: Not on file    Food insecurity     Worry: Not on file     Inability: Not on file    Transportation needs     Medical: Not on file     Non-medical: Not on file   Tobacco Use    Smoking status: Never Smoker    Smokeless tobacco: Never Used   Substance and Sexual Activity    Alcohol use: No    Drug use: No    Sexual activity: Not on file   Lifestyle    Physical activity     Days per week: Not on file     Minutes per session: Not on file    Stress: Not on file   Relationships    Social connections     Talks on phone: Not on file     Gets together: Not on file     Attends Confucianism service: Not on file     Active member of club or organization: Not on file     Attends meetings of clubs or organizations: Not on file     Relationship status: Not on file    Intimate partner violence     Fear of current or ex partner: Not on file     Emotionally abused: Not on file     Physically abused: Not on file     Forced sexual activity: Not on file   Other Topics Concern    Not on file   Social History Narrative    Not on file     Family History Problem Relation Age of Onset    Diabetes Mother     Diabetes Brother      Current Outpatient Medications   Medication Sig Dispense Refill    glipiZIDE (GLUCOTROL) 5 MG tablet TAKE 3 TABLETS BY MOUTH IN THE MORNING AND IN THE EVENING 540 tablet 0    metFORMIN (GLUCOPHAGE) 1000 MG tablet TAKE 1 TABLET BY MOUTH TWICE DAILY WITH MEALS 180 tablet 0    empagliflozin (JARDIANCE) 25 MG tablet TAKE 1 TABLET BY MOUTH DAILY 90 tablet 0    TRULICITY 1.5 GJ/9.3UV SOPN INJECT 1.5 MG INTO THE SKIN ONCE WEEKLY 12 pen 0     No current facility-administered medications for this visit.       Allergies   Allergen Reactions    Pcn [Penicillins]      Family Status   Relation Name Status    Mother      Brother  Alive       Lab Review:    No results found for: WBC, HGB, HCT, MCV, PLT  Lab Results   Component Value Date     2020    K 4.8 2020     2020    CO2 28 2020    BUN 16 2020    CREATININE 0.8 2020    GLUCOSE 135 2020    CALCIUM 9.2 2020    PROT 6.9 2020    LABALBU 4.2 2020    BILITOT 0.4 2020    ALKPHOS 66 2020    AST 24 2020    ALT 38 2020    LABGLOM >60 2020    GFRAA >60 2020    AGRATIO 1.6 2020    GLOB 2.7 2020     Lab Results   Component Value Date    TSH 1.66 2019     Lab Results   Component Value Date    LABA1C 6.7 2020     Lab Results   Component Value Date    .6 2020     Lab Results   Component Value Date    CHOL 119 2020     Lab Results   Component Value Date    TRIG 71 2020     Lab Results   Component Value Date    HDL 48 2020     Lab Results   Component Value Date    LDLCALC 57 2020     Lab Results   Component Value Date    LABVLDL 14 2020     No results found for: [facility]  Lab Results   Component Value Date    LABMICR <1.20 2020     No results found for: VITD25     Review of Systems:  Constitutional: no fatigue, no fever, no recent weight gain, no recent weight loss, no changes in appetite  Eyes: no eye pain, no change in vision, no eye redness, no eye irritation, no double vision  Ears, nose, throat: no nasal congestion, no sore throat, no earache, no decrease in hearing, no hoarseness, no dry mouth, no sinus problems, no difficulty swallowing, no neck lumps, no dental problems, no mouth sores, no ringing in ears  Pulmonary: no shortness of breath, no wheezing, no dyspnea on exertion, no cough  Cardiovascular: no chest pain, no lower extremity edema, no orthopnea, no intermittent leg claudication, no palpitations  Gastrointestinal: no abdominal pain, no nausea, no vomiting, no diarrhea, no constipation, no dysphagia, no heartburn, no bloating  Genitourinary: no dysuria, no urinary incontinence, no urinary hesitancy, no urinary frequency, no feelings of urinary urgency, no nocturia  Musculoskeletal: no joint swelling, no joint stiffness, no joint pain, no muscle cramps, no muscle pain, no bone pain  Integument/Breast: no hair loss, no skin rashes, no skin lesions, no itching, no dry skin  Neurological: no numbness, no tingling, no weakness, no confusion, no headaches, no dizziness, no fainting, no tremors, no decrease in memory, no balance problems  Psychiatric: no anxiety, no depression, no insomnia  Hematologic/Lymphatic: no tendency for easy bleeding, no swollen lymph nodes, no tendency for easy bruising  Immunology: no seasonal allergies, no frequent infections, no frequent illnesses  Endocrine: no temperature intolerance, has thirst, frequent urination    ASSESSMENT/PLAN:    1. Controlled diabetes mellitus type 2 without complications ([facility])  [id number] 15-14-8.9-8.6-6.8-6.7  Metformin 9511 mg bid, Trulicity, Jardiance. Glipizide 5 mg 3 tabs bid. - Comprehensive Metabolic Panel; Future  - Hemoglobin A1C; Future  - Lipid Panel; Future  - Microalbumin / Creatinine Urine Ratio; Future    2.  Status post gastric surgery  Diet,

## 2020-05-27 RX ORDER — EMPAGLIFLOZIN 25 MG/1
TABLET, FILM COATED ORAL
Qty: 90 TABLET | Refills: 0 | Status: SHIPPED | OUTPATIENT
Start: 2020-05-27 | End: 2020-08-27

## 2020-05-27 RX ORDER — GLIPIZIDE 5 MG/1
TABLET ORAL
Qty: 540 TABLET | Refills: 0 | Status: SHIPPED | OUTPATIENT
Start: 2020-05-27 | End: 2020-08-28 | Stop reason: SDUPTHER

## 2020-06-08 RX ORDER — DULAGLUTIDE 1.5 MG/.5ML
INJECTION, SOLUTION SUBCUTANEOUS
Qty: 12 PEN | Refills: 0 | Status: SHIPPED | OUTPATIENT
Start: 2020-06-08 | End: 2020-08-28 | Stop reason: SDUPTHER

## 2020-06-12 NOTE — TELEPHONE ENCOUNTER
Pt scheduled FU appt.  The visit from 3-27-20 was not canceled he was actually seen but at that time we were canceling at the end of day so it would fall of schedule (visit was dictated)

## 2020-08-25 RX ORDER — GLIPIZIDE 5 MG/1
TABLET ORAL
Qty: 540 TABLET | Refills: 0 | OUTPATIENT
Start: 2020-08-25

## 2020-08-25 NOTE — TELEPHONE ENCOUNTER
Please asked patient if he has enough glipizide to last until his appointment. I would like to discuss his dose during appointment.

## 2020-08-25 NOTE — TELEPHONE ENCOUNTER
MrBreanna Samuel Rothman stated he does not have enough Glipizide to last him until seen in Sept. He had to cancel appointment for September due to work. He has moved his follow up to this Friday to discuss medication.

## 2020-08-27 RX ORDER — EMPAGLIFLOZIN 25 MG/1
TABLET, FILM COATED ORAL
Qty: 90 TABLET | Refills: 0 | Status: SHIPPED | OUTPATIENT
Start: 2020-08-27 | End: 2020-08-28 | Stop reason: SDUPTHER

## 2020-08-28 ENCOUNTER — VIRTUAL VISIT (OUTPATIENT)
Dept: ENDOCRINOLOGY | Age: 50
End: 2020-08-28
Payer: COMMERCIAL

## 2020-08-28 PROCEDURE — 2022F DILAT RTA XM EVC RTNOPTHY: CPT | Performed by: INTERNAL MEDICINE

## 2020-08-28 PROCEDURE — G8427 DOCREV CUR MEDS BY ELIG CLIN: HCPCS | Performed by: INTERNAL MEDICINE

## 2020-08-28 PROCEDURE — 3044F HG A1C LEVEL LT 7.0%: CPT | Performed by: INTERNAL MEDICINE

## 2020-08-28 PROCEDURE — 99214 OFFICE O/P EST MOD 30 MIN: CPT | Performed by: INTERNAL MEDICINE

## 2020-08-28 RX ORDER — EMPAGLIFLOZIN 25 MG/1
TABLET, FILM COATED ORAL
Qty: 90 TABLET | Refills: 1 | Status: SHIPPED | OUTPATIENT
Start: 2020-08-28 | End: 2021-02-16 | Stop reason: SDUPTHER

## 2020-08-28 RX ORDER — GLIPIZIDE 5 MG/1
TABLET ORAL
Qty: 540 TABLET | Refills: 1 | Status: SHIPPED | OUTPATIENT
Start: 2020-08-28 | End: 2021-02-16 | Stop reason: SDUPTHER

## 2020-08-28 RX ORDER — DULAGLUTIDE 1.5 MG/.5ML
INJECTION, SOLUTION SUBCUTANEOUS
Qty: 12 PEN | Refills: 1 | Status: SHIPPED | OUTPATIENT
Start: 2020-08-28 | End: 2021-02-12

## 2020-08-28 NOTE — PROGRESS NOTES
Abbey Gonzales is a 52 y.o. male who presented for phone appointment for Type 2 diabetes mellitus. Current symptoms/problems include hyperglycemia and are unchanged. 1.  Controlled type 2 diabetes mellitus without complication, without long-term current use of insulin ([facility] [address] [E11.9]    Diagnosed with Type 2 diabetes mellitus in . He did not bring his BG record. Comorbid conditions: none    Current diabetic medications include: Metformin 9322 mg bid, Trulicity, Jardiance, glipizide. Intolerance to diabetes medications: No   Prefers do not take injectables.     Weight trend: stable  Prior visit with dietician: yes  Current diet: on average, 5 small meals per day  Current exercise: 3 days a week     Current monitoring regimen: home blood tests - 2 times daily   Has brought blood glucose log/meter:  Yes  Home blood sugar records: fasting range: 115-120 and postprandial range: 120-140  Any episodes of hypoglycemia? No  Hypoglycemia frequency and time(s):  none  Does patient have Glucagon emergency kit? No  Does patient have rapid acting carbohydrate? No  Does patient wear a medic alert bracelet or necklace? No    Mother  from diabetes complications. At age 61. On Lantus and Humalog prior to surgery.      2. Status post gastric surgery  Lost 160 lbs after gastric sleeve surgery. 3.  Overweight  Follows eating instructions as for history of gastric surgery.       Narrative   EXAMINATION:   THYROID ULTRASOUND       2019       COMPARISON:   None       HISTORY:   ORDERING SYSTEM PROVIDED HISTORY: Goiter   TECHNOLOGIST PROVIDED HISTORY:   Ordering Physician Provided Reason for Exam: goiter   Acuity: Unknown   Type of Exam: Unknown       FINDINGS:   Right thyroid lobe:  4.3 x 1.6 x 1.4 cm       Left thyroid lobe:  4.6 x 1.3 x 1.5 cm       Isthmus:  2 mm       Thyroid Gland:  Thyroid gland demonstrates normal echotexture and vascularity.       Nodules: No thyroid nodules are present.       Cervical lymphadenopathy: On the right there is a 1.7 x 1.0 x 0.9 cm normal   appearing lymph node.  On the left a pair of normal appearing lymph nodes   measuring 8 x 9 x 3 mm and 8 x 4 x 5 mm are noted.             Past Medical History:   Diagnosis Date    COVID-19 03/2020    Diabetes mellitus ([facility])     Obesity       Patient Active Problem List   Diagnosis    Diabetes mellitus type 2, controlled, without complications ([facility])    Status post gastric surgery    Overweight (BMI 25.0-29. 9)     Past Surgical History:   Procedure Laterality Date    GASTRIC BYPASS SURGERY      NASAL SEPTUM SURGERY       Social History     Socioeconomic History    Marital status:      Spouse name: Not on file    Number of children: Not on file    Years of education: Not on file    Highest education level: Not on file   Occupational History    Not on file   Social Needs    Financial resource strain: Not on file    Food insecurity     Worry: Not on file     Inability: Not on file    Transportation needs     Medical: Not on file     Non-medical: Not on file   Tobacco Use    Smoking status: Never Smoker    Smokeless tobacco: Never Used   Substance and Sexual Activity    Alcohol use: No    Drug use: No    Sexual activity: Not on file   Lifestyle    Physical activity     Days per week: Not on file     Minutes per session: Not on file    Stress: Not on file   Relationships    Social connections     Talks on phone: Not on file     Gets together: Not on file     Attends Confucianism service: Not on file     Active member of club or organization: Not on file     Attends meetings of clubs or organizations: Not on file     Relationship status: Not on file    Intimate partner violence     Fear of current or ex partner: Not on file     Emotionally abused: Not on file     Physically abused: Not on file     Forced sexual activity: Not on file   Other Topics Concern    Not on file   Social History Narrative    Not on file     Family History   Problem Relation Age of Onset    Diabetes Mother     Diabetes Brother      Current Outpatient Medications   Medication Sig Dispense Refill    empagliflozin (JARDIANCE) 25 MG tablet Once daily 90 tablet 1    glipiZIDE (GLUCOTROL) 5 MG tablet 3 in the morning and 3 in the evening 540 tablet 1    metFORMIN (GLUCOPHAGE) 1000 MG tablet One twice a day 180 tablet 1    TRULICITY 1.5 WL/3.9KQ SOPN Inject once weekly 12 pen 1     No current facility-administered medications for this visit.       Allergies   Allergen Reactions    Pcn [Penicillins]      Family Status   Relation Name Status    Mother      Brother  Alive       Lab Review:    No results found for: WBC, HGB, HCT, MCV, PLT  Lab Results   Component Value Date     2020    K 4.8 2020     2020    CO2 28 2020    BUN 16 2020    CREATININE 0.8 2020    GLUCOSE 135 2020    CALCIUM 9.2 2020    PROT 6.9 2020    LABALBU 4.2 2020    BILITOT 0.4 2020    ALKPHOS 66 2020    AST 24 2020    ALT 38 2020    LABGLOM >60 2020    GFRAA >60 2020    AGRATIO 1.6 2020    GLOB 2.7 2020     Lab Results   Component Value Date    TSH 1.66 2019     Lab Results   Component Value Date    LABA1C 6.7 2020     Lab Results   Component Value Date    .6 2020     Lab Results   Component Value Date    CHOL 119 2020     Lab Results   Component Value Date    TRIG 71 2020     Lab Results   Component Value Date    HDL 48 2020     Lab Results   Component Value Date    LDLCALC 57 2020     Lab Results   Component Value Date    LABVLDL 14 2020     No results found for: [facility]  Lab Results   Component Value Date    LABMICR <1.20 2020     No results found for: VITD25     Review of Systems:  Constitutional: no fatigue, no fever, no recent weight gain, no recent weight loss, no changes in appetite  Eyes: no Microalbumin / Creatinine Urine Ratio; Future    2. Status post gastric surgery  Diet, exercise. 3.  Overweight  Continue diet and exercise. Reviewed and/or ordered clinical lab results Yes  Reviewed and/or ordered radiology tests No  Reviewed and/or ordered other diagnostic tests No  Discussed test results with performing physician No  Independently reviewed image, tracing, or specimen No  Made a decision to obtain old records No  Reviewed and summarized old records Yes  Obtained history from other than patient No    [address]  was counseled regarding symptoms of diabetes diagnosis, course and complications, side effects of medications, labs, imaging and treatment targets and goals. He understands instructions and counseling. This was a phone conversation in lieu of an in-person visit due to coronavirus emergency. Patient provided verbal consent for an over the phone visit  I spent 15 min on this call conducting an interview, performing a limited exam by phone, and educating the patient on my assessment and plan. Total visit time 25 minutes, more than 50% was counseling time      Return in about 6 months (around 2/28/2021) for diabetes.

## 2021-02-11 DIAGNOSIS — E11.65 TYPE 2 DIABETES MELLITUS WITH HYPERGLYCEMIA (HCC): ICD-10-CM

## 2021-02-12 RX ORDER — DULAGLUTIDE 1.5 MG/.5ML
INJECTION, SOLUTION SUBCUTANEOUS
Qty: 4 PEN | Refills: 0 | Status: SHIPPED | OUTPATIENT
Start: 2021-02-12 | End: 2021-02-16 | Stop reason: SDUPTHER

## 2021-02-16 ENCOUNTER — VIRTUAL VISIT (OUTPATIENT)
Dept: ENDOCRINOLOGY | Age: 51
End: 2021-02-16
Payer: COMMERCIAL

## 2021-02-16 DIAGNOSIS — Z98.890 STATUS POST GASTRIC SURGERY: ICD-10-CM

## 2021-02-16 DIAGNOSIS — E11.9 CONTROLLED TYPE 2 DIABETES MELLITUS WITHOUT COMPLICATION, WITHOUT LONG-TERM CURRENT USE OF INSULIN (HCC): Primary | ICD-10-CM

## 2021-02-16 DIAGNOSIS — E66.3 OVERWEIGHT (BMI 25.0-29.9): ICD-10-CM

## 2021-02-16 PROCEDURE — G8427 DOCREV CUR MEDS BY ELIG CLIN: HCPCS | Performed by: INTERNAL MEDICINE

## 2021-02-16 PROCEDURE — 99213 OFFICE O/P EST LOW 20 MIN: CPT | Performed by: INTERNAL MEDICINE

## 2021-02-16 PROCEDURE — 2022F DILAT RTA XM EVC RTNOPTHY: CPT | Performed by: INTERNAL MEDICINE

## 2021-02-16 PROCEDURE — 3046F HEMOGLOBIN A1C LEVEL >9.0%: CPT | Performed by: INTERNAL MEDICINE

## 2021-02-16 RX ORDER — DULAGLUTIDE 1.5 MG/.5ML
INJECTION, SOLUTION SUBCUTANEOUS
Qty: 12 PEN | Refills: 1 | Status: SHIPPED | OUTPATIENT
Start: 2021-02-16 | End: 2021-08-05 | Stop reason: SDUPTHER

## 2021-02-16 RX ORDER — EMPAGLIFLOZIN 25 MG/1
1 TABLET, FILM COATED ORAL DAILY
Qty: 90 TABLET | Refills: 1 | Status: SHIPPED | OUTPATIENT
Start: 2021-02-16 | End: 2021-08-05 | Stop reason: SDUPTHER

## 2021-02-16 RX ORDER — GLIPIZIDE 5 MG/1
10 TABLET ORAL 2 TIMES DAILY
Qty: 360 TABLET | Refills: 1 | Status: SHIPPED | OUTPATIENT
Start: 2021-02-16 | End: 2021-08-05 | Stop reason: SDUPTHER

## 2021-02-16 NOTE — PROGRESS NOTES
Meredith Rubinstein is a 52 y.o. male who presented for a follow-up knee appointment for Type 2 diabetes mellitus. 2021    TELEHEALTH EVALUATION -- Audio/Visual (During [facility]- public health emergency)    Patient provided verbal consent to use the video visit. HPI:    Meredith Rubinstein (:  1970) has requested an audio/video evaluation for the following concern(s):      Current symptoms/problems include hyperglycemia and are unchanged. 1.  Controlled type 2 diabetes mellitus without complication, without long-term current use of insulin ([facility] [address] [E11.9]    Diagnosed with Type 2 diabetes mellitus in . Comorbid conditions: none    Current diabetic medications include: Metformin 4894 mg bid, Trulicity, Jardiance, glipizide. Intolerance to diabetes medications: No   Prefers do not take injectables.     Weight trend: stable  Prior visit with dietician: yes  Current diet: on average, 5 small meals per day  Current exercise: 3 days a week     Current monitoring regimen: home blood tests - 2 times daily   Has brought blood glucose log/meter:  Yes  Home blood sugar records: fasting range: 115-120 and postprandial range: 120-140  Any episodes of hypoglycemia? No  Hypoglycemia frequency and time(s):  none  Does patient have Glucagon emergency kit? No  Does patient have rapid acting carbohydrate? No  Does patient wear a medic alert bracelet or necklace? No    Mother  from diabetes complications. At age 61. On Lantus and Humalog prior to surgery.      2. Status post gastric surgery  Lost 160 lbs after gastric sleeve surgery. 3.  Overweight  Follows eating instructions as for history of gastric surgery.       Narrative   EXAMINATION:   THYROID ULTRASOUND       2019       COMPARISON:   None       HISTORY:   ORDERING SYSTEM PROVIDED HISTORY: Goiter   TECHNOLOGIST PROVIDED HISTORY:   Ordering Physician Provided Reason for Exam: goiter   Acuity: Unknown   Type of Exam: Unknown       FINDINGS:   Right thyroid lobe:  4.3 x 1.6 x 1.4 cm       Left thyroid lobe:  4.6 x 1.3 x 1.5 cm       Isthmus:  2 mm       Thyroid Gland:  Thyroid gland demonstrates normal echotexture and vascularity.       Nodules: No thyroid nodules are present.       Cervical lymphadenopathy: On the right there is a 1.7 x 1.0 x 0.9 cm normal   appearing lymph node.  On the left a pair of normal appearing lymph nodes   measuring 8 x 9 x 3 mm and 8 x 4 x 5 mm are noted.             Past Medical History:   Diagnosis Date    COVID-19 03/2020    Diabetes mellitus ([facility] [address]     Obesity       Patient Active Problem List   Diagnosis    Diabetes mellitus type 2, controlled, without complications ([facility] [address]    Status post gastric surgery    Overweight (BMI 25.0-29. 9)     Past Surgical History:   Procedure Laterality Date    GASTRIC BYPASS SURGERY      NASAL SEPTUM SURGERY       Social History     Socioeconomic History    Marital status:      Spouse name: Not on file    Number of children: Not on file    Years of education: Not on file    Highest education level: Not on file   Occupational History    Not on file   Social Needs    Financial resource strain: Not on file    Food insecurity     Worry: Not on file     Inability: Not on file    Transportation needs     Medical: Not on file     Non-medical: Not on file   Tobacco Use    Smoking status: Never Smoker    Smokeless tobacco: Never Used   Substance and Sexual Activity    Alcohol use: No    Drug use: No    Sexual activity: Not on file   Lifestyle    Physical activity     Days per week: Not on file     Minutes per session: Not on file    Stress: Not on file   Relationships    Social connections     Talks on phone: Not on file     Gets together: Not on file     Attends Rastafarian service: Not on file     Active member of club or organization: Not on file     Attends meetings of clubs or organizations: Not on file     Relationship status: Not on file    Intimate partner violence Fear of current or ex partner: Not on file     Emotionally abused: Not on file     Physically abused: Not on file     Forced sexual activity: Not on file   Other Topics Concern    Not on file   Social History Narrative    Not on file     Family History   Problem Relation Age of Onset    Diabetes Mother     Diabetes Brother      Current Outpatient Medications   Medication Sig Dispense Refill    TRULICITY 1.5 FM/3.6AZ SOPN INJECT ONCE WEEKLY AS DIRECTED 4 pen 0    empagliflozin (JARDIANCE) 25 MG tablet Once daily 90 tablet 1    glipiZIDE (GLUCOTROL) 5 MG tablet 3 in the morning and 3 in the evening 540 tablet 1    metFORMIN (GLUCOPHAGE) 1000 MG tablet One twice a day 180 tablet 1     No current facility-administered medications for this visit.       Allergies   Allergen Reactions    Pcn [Penicillins]      Family Status   Relation Name Status    Mother      Brother  Alive       Lab Review:    No results found for: WBC, HGB, HCT, MCV, PLT  Lab Results   Component Value Date     2020    K 4.8 2020     2020    CO2 28 2020    BUN 16 2020    CREATININE 0.8 2020    GLUCOSE 135 2020    CALCIUM 9.2 2020    PROT 6.9 2020    LABALBU 4.2 2020    BILITOT 0.4 2020    ALKPHOS 66 2020    AST 24 2020    ALT 38 2020    LABGLOM >60 2020    GFRAA >60 2020    AGRATIO 1.6 2020    GLOB 2.7 2020     Lab Results   Component Value Date    TSH 1.66 2019     Lab Results   Component Value Date    LABA1C 6.7 2020     Lab Results   Component Value Date    .6 2020     Lab Results   Component Value Date    CHOL 119 2020     Lab Results   Component Value Date    TRIG 71 2020     Lab Results   Component Value Date    HDL 48 2020     Lab Results   Component Value Date    LDLCALC 57 2020     Lab Results   Component Value Date    LABVLDL 14 2020     No results found for:  [facility]  Lab Results   Component Value Date    LABMICR <1.20 03/26/2020     No results found for: VITD25     Review of Systems:  Constitutional: no fatigue, no fever, no recent weight gain, no recent weight loss, no changes in appetite  Eyes: no eye pain, no change in vision, no eye redness, no eye irritation, no double vision  Ears, nose, throat: no nasal congestion, no sore throat, no earache, no decrease in hearing, no hoarseness, no dry mouth, no sinus problems, no difficulty swallowing, no neck lumps, no dental problems, no mouth sores, no ringing in ears  Pulmonary: no shortness of breath, no wheezing, no dyspnea on exertion, no cough  Cardiovascular: no chest pain, no lower extremity edema, no orthopnea, no intermittent leg claudication, no palpitations  Gastrointestinal: no abdominal pain, no nausea, no vomiting, no diarrhea, no constipation, no dysphagia, no heartburn, no bloating  Genitourinary: no dysuria, no urinary incontinence, no urinary hesitancy, no urinary frequency, no feelings of urinary urgency, no nocturia  Musculoskeletal: no joint swelling, no joint stiffness, no joint pain, no muscle cramps, no muscle pain, no bone pain  Integument/Breast: no hair loss, no skin rashes, no skin lesions, no itching, no dry skin  Neurological: no numbness, no tingling, no weakness, no confusion, no headaches, no dizziness, no fainting, no tremors, no decrease in memory, no balance problems  Psychiatric: no anxiety, no depression, no insomnia  Hematologic/Lymphatic: no tendency for easy bleeding, no swollen lymph nodes, no tendency for easy bruising  Immunology: no seasonal allergies, no frequent infections, no frequent illnesses  Endocrine: no temperature intolerance, has thirst, frequent urination    Wt Readings from Last 3 Encounters:   10/24/19 211 lb 12.8 oz (96.1 kg)   06/04/19 208 lb (94.3 kg)   03/04/19 210 lb (95.3 kg)     OBJECTIVE:  Constitutional: no apparent distress, well developed and well nourished  Mental status: alert and awake, oriented to person, place and time, able to follow commands  Psychiatric: judgement and insight and normal, recent and remote memory are intact, mood and affect are normal  Skin: skin inspection appears normal, no significant exanthematous lesions or discoloration noted on facial skin  Head and Face: head and face inspection revealed no abnormalities, normocephalic, atraumatic  Eyes: no lid or conjunctival swelling, erythema or discharge, sclera appears normal  Ears/Nose: external inspection of ears and nose revealed no abnormalities, hearing is grossly normal  Oropharynx/Mouth/Face: lips are normal with no lesions, the voice quality was normal  Neck: neck is symmetric, no visualized mass  Pulmonary/chest: respiratory effort normal, no generalized signs of difficulty breathing or signs of respiratory distress  Musculoskeletal: normal station, normal range of motion of neck  Neurological: no facial asymmetry, normal general cortical function      ASSESSMENT/PLAN:    1. Controlled diabetes mellitus type 2 without complications ([facility])  [id number] 15-14-8.9-8.6-6.86.7  Metformin 8831 mg bid, Trulicity 1.5 mg weekly, Jardiance 25 mg daily. Glipizide 5 mg 2 tabs bid. - Comprehensive Metabolic Panel; Future  - Hemoglobin A1C; Future  - Lipid Panel; Future  - Microalbumin / Creatinine Urine Ratio; Future    2. Status post gastric surgery  Diet, exercise. 3.  Overweight  Continue diet and exercise.     Reviewed and/or ordered clinical lab results Yes  Reviewed and/or ordered radiology tests No  Reviewed and/or ordered other diagnostic tests No  Discussed test results with performing physician No  Independently reviewed image, tracing, or specimen No  Made a decision to obtain old records No  Reviewed and summarized old records Yes  Obtained history from other than patient No    [address] was counseled regarding symptoms of diabetes diagnosis, course and complications, side effects of medications, labs, imaging and treatment targets and goals. He understands instructions and counseling. Sun Chavez is a 52 y.o. male being evaluated by a Virtual Visit (video visit) encounter, including two-way audio and video communication, in lieu of an in-person visit due to coronavirus emergency, to address concerns as mentioned in history and assessment and plan. Patient identification was verified at the start of the visit. I conducted an interview, performed a limited exam by video and educated the patient on my assessment and plan. Due to this being a TeleHealth encounter (During [facility]-51 public health emergency), evaluation of the following organ systems was limited: Vitals/Constitutional/EENT/Resp/CV/GI//MS/Neuro/Skin/Heme-Lymph-Imm. Pursuant to the emergency declaration under the [address] [place] [address], [address] [place] [address] authority and the [employer] Act, this Virtual Visit was conducted with patient's (and/or legal guardian's) consent, to reduce the patient's risk of exposure to COVID-19 and provide necessary medical care. The patient (and/or legal guardian) has also been advised to contact this office for worsening conditions or problems, and seek emergency medical treatment and/or call 911 if deemed necessary. Total time spent on this encounter via Telehealth (synchronous, real-time audio/visual connection): 20 min    See assessment, plan and counseling note for counseling and care coordination details. Services were provided through a video synchronous discussion virtually to substitute for in-person clinic visit. Persons participating in the telehealth service: provider - Brayan Fletcher MD and patient Sun Chavez. Provider was located at her office. Patient was located at home.     --Brayan Fletcher MD on 2/19/2021 at 11:03 PM    An electronic signature was used to authenticate this note. Return in about 3 months (around 5/16/2021) for diabetes.

## 2021-08-04 DIAGNOSIS — E11.9 CONTROLLED TYPE 2 DIABETES MELLITUS WITHOUT COMPLICATION, WITHOUT LONG-TERM CURRENT USE OF INSULIN (HCC): ICD-10-CM

## 2021-08-04 RX ORDER — DULAGLUTIDE 1.5 MG/.5ML
INJECTION, SOLUTION SUBCUTANEOUS
Refills: 1 | OUTPATIENT
Start: 2021-08-04

## 2021-08-04 NOTE — TELEPHONE ENCOUNTER
Requested Prescriptions     Pending Prescriptions Disp Refills    TRULICITY 1.5 NL/0.7HP SOPN [Pharmacy Med Name: TRULICITY 1.5 IJ/8.4 ML PEN]  1     Sig: INJECT ONCE WEEKLY AS DIRECTED     Patient needs appt.

## 2021-08-05 ENCOUNTER — VIRTUAL VISIT (OUTPATIENT)
Dept: ENDOCRINOLOGY | Age: 51
End: 2021-08-05
Payer: COMMERCIAL

## 2021-08-05 DIAGNOSIS — E66.3 OVERWEIGHT (BMI 25.0-29.9): ICD-10-CM

## 2021-08-05 DIAGNOSIS — E11.9 CONTROLLED TYPE 2 DIABETES MELLITUS WITHOUT COMPLICATION, WITHOUT LONG-TERM CURRENT USE OF INSULIN (HCC): Primary | ICD-10-CM

## 2021-08-05 DIAGNOSIS — Z98.890 STATUS POST GASTRIC SURGERY: ICD-10-CM

## 2021-08-05 PROCEDURE — G8427 DOCREV CUR MEDS BY ELIG CLIN: HCPCS | Performed by: INTERNAL MEDICINE

## 2021-08-05 PROCEDURE — 99214 OFFICE O/P EST MOD 30 MIN: CPT | Performed by: INTERNAL MEDICINE

## 2021-08-05 PROCEDURE — 3046F HEMOGLOBIN A1C LEVEL >9.0%: CPT | Performed by: INTERNAL MEDICINE

## 2021-08-05 PROCEDURE — 2022F DILAT RTA XM EVC RTNOPTHY: CPT | Performed by: INTERNAL MEDICINE

## 2021-08-05 RX ORDER — EMPAGLIFLOZIN 25 MG/1
1 TABLET, FILM COATED ORAL DAILY
Qty: 90 TABLET | Refills: 1 | Status: SHIPPED | OUTPATIENT
Start: 2021-08-05 | End: 2022-01-31 | Stop reason: SDUPTHER

## 2021-08-05 RX ORDER — GLIPIZIDE 5 MG/1
10 TABLET ORAL 2 TIMES DAILY
Qty: 360 TABLET | Refills: 1 | Status: SHIPPED | OUTPATIENT
Start: 2021-08-05 | End: 2022-01-31 | Stop reason: SDUPTHER

## 2021-08-05 RX ORDER — DULAGLUTIDE 1.5 MG/.5ML
INJECTION, SOLUTION SUBCUTANEOUS
Qty: 12 PEN | Refills: 1 | Status: SHIPPED | OUTPATIENT
Start: 2021-08-05 | End: 2022-01-17 | Stop reason: SDUPTHER

## 2021-08-05 NOTE — PROGRESS NOTES
Darylene Phy is a 54 y.o. male who presented for a follow-up knee appointment for Type 2 diabetes mellitus. 2021    TELEHEALTH EVALUATION -- Audio/Visual (During PPTOZ-42 public health emergency)    Patient provided verbal consent to use the video visit. HPI:    Darylene Phy (:  1970) has requested an audio/video evaluation for the following concern(s):      Current symptoms/problems include hyperglycemia and are unchanged. 1.  Controlled type 2 diabetes mellitus without complication, without long-term current use of insulin ([facility] [address] [E11.9]    Diagnosed with Type 2 diabetes mellitus in . Comorbid conditions: none    Current diabetic medications include: Metformin 5049 mg bid, Trulicity, Jardiance, glipizide. Intolerance to diabetes medications: No   Prefers do not take injectables.     Weight trend: stable  Prior visit with dietician: yes  Current diet: on average, 5 small meals per day  Current exercise: 3 days a week     Current monitoring regimen: home blood tests - 2 times daily   Has brought blood glucose log/meter:  Yes  Home blood sugar records: fasting range: 115-120 and postprandial range: 100-150  Any episodes of hypoglycemia? No  Hypoglycemia frequency and time(s):  none  Does patient have Glucagon emergency kit? No  Does patient have rapid acting carbohydrate? No  Does patient wear a medic alert bracelet or necklace? No    Mother  from diabetes complications. At age 61. On Lantus and Humalog prior to surgery.      2. Status post gastric surgery  Lost 160 lbs after gastric sleeve surgery. 3.  Overweight  Follows eating instructions as for history of gastric surgery.       Narrative   EXAMINATION:   THYROID ULTRASOUND       2019       COMPARISON:   None       HISTORY:   ORDERING SYSTEM PROVIDED HISTORY: Goiter   TECHNOLOGIST PROVIDED HISTORY:   Ordering Physician Provided Reason for Exam: goiter   Acuity: Unknown   Type of Exam: Unknown       FINDINGS:   Right thyroid lobe:  4.3 x 1.6 x 1.4 cm       Left thyroid lobe:  4.6 x 1.3 x 1.5 cm       Isthmus:  2 mm       Thyroid Gland:  Thyroid gland demonstrates normal echotexture and vascularity.       Nodules: No thyroid nodules are present.       Cervical lymphadenopathy: On the right there is a 1.7 x 1.0 x 0.9 cm normal   appearing lymph node.  On the left a pair of normal appearing lymph nodes   measuring 8 x 9 x 3 mm and 8 x 4 x 5 mm are noted.             Past Medical History:   Diagnosis Date    COVID-19 03/2020    Diabetes mellitus ([facility] [address]     Obesity       Patient Active Problem List   Diagnosis    Diabetes mellitus type 2, controlled, without complications ([facility] [address]    Status post gastric surgery    Overweight (BMI 25.0-29. 9)     Past Surgical History:   Procedure Laterality Date    GASTRIC BYPASS SURGERY      NASAL SEPTUM SURGERY       Social History     Socioeconomic History    Marital status:      Spouse name: Not on file    Number of children: Not on file    Years of education: Not on file    Highest education level: Not on file   Occupational History    Not on file   Tobacco Use    Smoking status: Never Smoker    Smokeless tobacco: Never Used   Vaping Use    Vaping Use: Never used   Substance and Sexual Activity    Alcohol use: No    Drug use: No    Sexual activity: Not on file   Other Topics Concern    Not on file   Social History Narrative    Not on file     Social Determinants of Health     Financial Resource Strain:     Difficulty of Paying Living Expenses:    Food Insecurity:     Worried About Running Out of Food in the Last Year:     Ran Out of Food in the Last Year:    Transportation Needs:     Lack of Transportation (Medical):      Lack of Transportation (Non-Medical):    Physical Activity:     Days of Exercise per Week:     Minutes of Exercise per Session:    Stress:     Feeling of Stress :    Social Connections:     Frequency of Communication with Friends and Family:     Frequency of Social Gatherings with Friends and Family:     Attends [employer] Services:     Active Member of Clubs or Organizations:     Attends Club or Organization Meetings:     Marital Status:    Intimate Partner Violence:     Fear of Current or Ex-Partner:     Emotionally Abused:     Physically Abused:     Sexually Abused:      Family History   Problem Relation Age of Onset    Diabetes Mother     Diabetes Brother      Current Outpatient Medications   Medication Sig Dispense Refill    empagliflozin (JARDIANCE) 25 MG tablet Take 1 tablet by mouth daily 90 tablet 1    glipiZIDE (GLUCOTROL) 5 MG tablet Take 2 tablets by mouth 2 times daily 360 tablet 1    metFORMIN (GLUCOPHAGE) 1000 MG tablet Take 1 tablet by mouth 2 times daily (with meals) 180 tablet 1    TRULICITY 1.5 XT/5.1FY SOPN INJECT ONCE WEEKLY AS DIRECTED 12 pen 1     No current facility-administered medications for this visit.      Allergies   Allergen Reactions    Pcn [Penicillins]      Family Status   Relation Name Status    Mother      Brother  Alive       Lab Review:    No results found for: WBC, HGB, HCT, MCV, PLT  Lab Results   Component Value Date     2020    K 4.8 2020     2020    CO2 28 2020    BUN 16 2020    CREATININE 0.8 2020    GLUCOSE 135 2020    CALCIUM 9.2 2020    PROT 6.9 2020    LABALBU 4.2 2020    BILITOT 0.4 2020    ALKPHOS 66 2020    AST 24 2020    ALT 38 2020    LABGLOM >60 2020    GFRAA >60 2020    AGRATIO 1.6 2020    GLOB 2.7 2020     Lab Results   Component Value Date    TSH 1.66 2019     Lab Results   Component Value Date    LABA1C 6.7 2020     Lab Results   Component Value Date    .6 2020     Lab Results   Component Value Date    CHOL 119 2020     Lab Results   Component Value Date    TRIG 71 2020     Lab Results   Component Value Date    HDL 48 10/24/19 211 lb 12.8 oz (96.1 kg)   06/04/19 208 lb (94.3 kg)   03/04/19 210 lb (95.3 kg)     OBJECTIVE:  Constitutional: no apparent distress, well developed and well nourished  Mental status: alert and awake, oriented to person, place and time, able to follow commands  Psychiatric: judgement and insight and normal, recent and remote memory are intact, mood and affect are normal  Skin: skin inspection appears normal, no significant exanthematous lesions or discoloration noted on facial skin  Head and Face: head and face inspection revealed no abnormalities, normocephalic, atraumatic  Eyes: no lid or conjunctival swelling, erythema or discharge, sclera appears normal  Ears/Nose: external inspection of ears and nose revealed no abnormalities, hearing is grossly normal  Oropharynx/Mouth/Face: lips are normal with no lesions, the voice quality was normal  Neck: neck is symmetric, no visualized mass  Pulmonary/chest: respiratory effort normal, no generalized signs of difficulty breathing or signs of respiratory distress  Musculoskeletal: normal station, normal range of motion of neck  Neurological: no facial asymmetry, normal general cortical function      ASSESSMENT/PLAN:    1. Controlled diabetes mellitus type 2 without complications (HCC)  OFQ6N 15-14-8.9-8.6-6.86.7  Metformin 2017 mg bid, Trulicity 1.5 mg weekly, Jardiance 25 mg daily. Glipizide 5 mg 2 tabs bid. - Comprehensive Metabolic Panel; Future  - Hemoglobin A1C; Future  - Lipid Panel; Future  - Microalbumin / Creatinine Urine Ratio; Future    2. Status post gastric surgery  Diet, exercise. 3.  Overweight  Continue diet and exercise.     Reviewed and/or ordered clinical lab results Yes  Reviewed and/or ordered radiology tests No  Reviewed and/or ordered other diagnostic tests No  Discussed test results with performing physician No  Independently reviewed image, tracing, or specimen No  Made a decision to obtain old records No  Reviewed and summarized old records Yes  Obtained history from other than patient No    [address] Mulu Boyce was counseled regarding symptoms of diabetes diagnosis, course and complications, side effects of medications, labs, imaging and treatment targets and goals. He understands instructions and counseling. [address] Mulu Boyce is a 54 y.o. male being evaluated by a Virtual Visit (video visit) encounter, including two-way audio and video communication, in lieu of an in-person visit due to coronavirus emergency, to address concerns as mentioned in history and assessment and plan. Patient identification was verified at the start of the visit. I conducted an interview, performed a limited exam by video and educated the patient on my assessment and plan. Due to this being a TeleHealth encounter (During [facility]- public health emergency), evaluation of the following organ systems was limited: Vitals/Constitutional/EENT/Resp/CV/GI//MS/Neuro/Skin/Heme-Lymph-Imm. Pursuant to the emergency declaration under the [address] [place] [address] authority and the [employer] Act, this Virtual Visit was conducted with patient's (and/or legal guardian's) consent, to reduce the patient's risk of exposure to COVID-19 and provide necessary medical care. The patient (and/or legal guardian) has also been advised to contact this office for worsening conditions or problems, and seek emergency medical treatment and/or call 911 if deemed necessary. Total time spent on this encounter via Telehealth (synchronous, real-time audio/visual connection): 30 min    See assessment, plan and counseling note for counseling and care coordination details. Services were provided through a video synchronous discussion virtually to substitute for in-person clinic visit.      Persons participating in the telehealth service: provider - Ramesh Menard MD and patient Gisele Marlene Denson.    Provider was located at her office. Patient was located at home. --Malik Saul MD on 8/5/2021 at 4:31 PM    An electronic signature was used to authenticate this note. Return in about 3 months (around 11/5/2021) for diabetes.

## 2022-01-11 DIAGNOSIS — Z98.890 STATUS POST GASTRIC SURGERY: ICD-10-CM

## 2022-01-11 DIAGNOSIS — E11.9 CONTROLLED TYPE 2 DIABETES MELLITUS WITHOUT COMPLICATION, WITHOUT LONG-TERM CURRENT USE OF INSULIN (HCC): ICD-10-CM

## 2022-01-11 RX ORDER — DULAGLUTIDE 1.5 MG/.5ML
INJECTION, SOLUTION SUBCUTANEOUS
Refills: 1 | OUTPATIENT
Start: 2022-01-11

## 2022-01-17 DIAGNOSIS — E11.9 CONTROLLED TYPE 2 DIABETES MELLITUS WITHOUT COMPLICATION, WITHOUT LONG-TERM CURRENT USE OF INSULIN (HCC): ICD-10-CM

## 2022-01-17 DIAGNOSIS — Z98.890 STATUS POST GASTRIC SURGERY: ICD-10-CM

## 2022-01-17 RX ORDER — DULAGLUTIDE 1.5 MG/.5ML
INJECTION, SOLUTION SUBCUTANEOUS
Qty: 4 PEN | Refills: 1 | Status: SHIPPED | OUTPATIENT
Start: 2022-01-17 | End: 2022-01-19 | Stop reason: SDUPTHER

## 2022-01-17 NOTE — TELEPHONE ENCOUNTER
Mr. David Eligiodionyannie would like to know date/time of NOV, also does he need lab work prior? He mentioned refills on VM but did not say which medication.

## 2022-01-19 DIAGNOSIS — E11.9 CONTROLLED TYPE 2 DIABETES MELLITUS WITHOUT COMPLICATION, WITHOUT LONG-TERM CURRENT USE OF INSULIN (HCC): ICD-10-CM

## 2022-01-19 DIAGNOSIS — Z98.890 STATUS POST GASTRIC SURGERY: ICD-10-CM

## 2022-01-19 RX ORDER — DULAGLUTIDE 1.5 MG/.5ML
INJECTION, SOLUTION SUBCUTANEOUS
Qty: 12 PEN | Refills: 1 | Status: SHIPPED | OUTPATIENT
Start: 2022-01-19 | End: 2022-07-11

## 2022-01-19 NOTE — TELEPHONE ENCOUNTER
The patient is out of [facility]. lt was ordered for mail order but wont arrive in time. The insurance said they would only pay for a 90 day supply to a local pharmacy. He needs this now as he is out of [facility]. His choice of local is [facility] in [place]. Can this be done today?

## 2022-01-26 DIAGNOSIS — E11.9 CONTROLLED TYPE 2 DIABETES MELLITUS WITHOUT COMPLICATION, WITHOUT LONG-TERM CURRENT USE OF INSULIN (HCC): ICD-10-CM

## 2022-01-26 DIAGNOSIS — Z98.890 STATUS POST GASTRIC SURGERY: ICD-10-CM

## 2022-01-26 LAB
A/G RATIO: 1.6 (ref 1.1–2.2)
ALBUMIN SERPL-MCNC: 4.5 G/DL (ref 3.4–5)
ALP BLD-CCNC: 65 U/L (ref 40–129)
ALT SERPL-CCNC: 49 U/L (ref 10–40)
ANION GAP SERPL CALCULATED.3IONS-SCNC: 16 MMOL/L (ref 3–16)
AST SERPL-CCNC: 30 U/L (ref 15–37)
BILIRUB SERPL-MCNC: 0.7 MG/DL (ref 0–1)
BUN BLDV-MCNC: 16 MG/DL (ref 7–20)
CALCIUM SERPL-MCNC: 9.6 MG/DL (ref 8.3–10.6)
CHLORIDE BLD-SCNC: 99 MMOL/L (ref 99–110)
CHOLESTEROL, TOTAL: 152 MG/DL (ref 0–199)
CO2: 25 MMOL/L (ref 21–32)
CREAT SERPL-MCNC: 0.8 MG/DL (ref 0.9–1.3)
CREATININE URINE: 98.8 MG/DL (ref 39–259)
GFR AFRICAN AMERICAN: >60
GFR NON-AFRICAN AMERICAN: >60
GLUCOSE BLD-MCNC: 166 MG/DL (ref 70–99)
HDLC SERPL-MCNC: 53 MG/DL (ref 40–60)
LDL CHOLESTEROL CALCULATED: 75 MG/DL
MICROALBUMIN UR-MCNC: <1.2 MG/DL
MICROALBUMIN/CREAT UR-RTO: NORMAL MG/G (ref 0–30)
POTASSIUM SERPL-SCNC: 4.9 MMOL/L (ref 3.5–5.1)
SODIUM BLD-SCNC: 140 MMOL/L (ref 136–145)
T4 FREE: 1.4 NG/DL (ref 0.9–1.8)
TOTAL PROTEIN: 7.3 G/DL (ref 6.4–8.2)
TRIGL SERPL-MCNC: 120 MG/DL (ref 0–150)
TSH SERPL DL<=0.05 MIU/L-ACNC: 1.81 UIU/ML (ref 0.27–4.2)
VLDLC SERPL CALC-MCNC: 24 MG/DL

## 2022-01-27 LAB
ESTIMATED AVERAGE GLUCOSE: 191.5 MG/DL
HBA1C MFR BLD: 8.3 %

## 2022-01-30 PROBLEM — E11.65 UNCONTROLLED TYPE 2 DIABETES MELLITUS WITH HYPERGLYCEMIA (HCC): Status: ACTIVE | Noted: 2022-01-30

## 2022-01-31 ENCOUNTER — OFFICE VISIT (OUTPATIENT)
Dept: ENDOCRINOLOGY | Age: 52
End: 2022-01-31
Payer: COMMERCIAL

## 2022-01-31 VITALS
HEIGHT: 72 IN | SYSTOLIC BLOOD PRESSURE: 148 MMHG | HEART RATE: 75 BPM | RESPIRATION RATE: 14 BRPM | BODY MASS INDEX: 33.32 KG/M2 | OXYGEN SATURATION: 98 % | WEIGHT: 246 LBS | DIASTOLIC BLOOD PRESSURE: 88 MMHG | TEMPERATURE: 98 F

## 2022-01-31 DIAGNOSIS — R79.89 ELEVATED LIVER FUNCTION TESTS: ICD-10-CM

## 2022-01-31 DIAGNOSIS — Z98.890 STATUS POST GASTRIC SURGERY: ICD-10-CM

## 2022-01-31 DIAGNOSIS — E11.65 UNCONTROLLED TYPE 2 DIABETES MELLITUS WITH HYPERGLYCEMIA (HCC): Primary | ICD-10-CM

## 2022-01-31 DIAGNOSIS — E66.3 OVERWEIGHT (BMI 25.0-29.9): ICD-10-CM

## 2022-01-31 PROCEDURE — G8484 FLU IMMUNIZE NO ADMIN: HCPCS | Performed by: INTERNAL MEDICINE

## 2022-01-31 PROCEDURE — G8417 CALC BMI ABV UP PARAM F/U: HCPCS | Performed by: INTERNAL MEDICINE

## 2022-01-31 PROCEDURE — 99214 OFFICE O/P EST MOD 30 MIN: CPT | Performed by: INTERNAL MEDICINE

## 2022-01-31 PROCEDURE — 3052F HG A1C>EQUAL 8.0%<EQUAL 9.0%: CPT | Performed by: INTERNAL MEDICINE

## 2022-01-31 PROCEDURE — 1036F TOBACCO NON-USER: CPT | Performed by: INTERNAL MEDICINE

## 2022-01-31 PROCEDURE — 2022F DILAT RTA XM EVC RTNOPTHY: CPT | Performed by: INTERNAL MEDICINE

## 2022-01-31 PROCEDURE — G8427 DOCREV CUR MEDS BY ELIG CLIN: HCPCS | Performed by: INTERNAL MEDICINE

## 2022-01-31 RX ORDER — GLIPIZIDE 5 MG/1
10 TABLET ORAL 2 TIMES DAILY
Qty: 360 TABLET | Refills: 1 | Status: SHIPPED | OUTPATIENT
Start: 2022-01-31 | End: 2022-05-31

## 2022-01-31 RX ORDER — EMPAGLIFLOZIN 25 MG/1
1 TABLET, FILM COATED ORAL DAILY
Qty: 90 TABLET | Refills: 1 | Status: SHIPPED | OUTPATIENT
Start: 2022-01-31 | End: 2022-09-01

## 2022-01-31 NOTE — PROGRESS NOTES
Abby Ambriz is a 54 y.o. male who presents for Type 2 diabetes mellitus. Current symptoms/problems include hyperglycemia and are worsening. 1.  Uncontrolled type 2 diabetes mellitus with hyperglycemia ([facility] [address] [E11.65]    Diagnosed with Type 2 diabetes mellitus in . He did not bring his BG record. Comorbid conditions: none    Current diabetic medications include: Metformin 1000 mg bid, Victoza, Jardiance, glipizide. Had higher BG during holiday, could not exercise, now back to exercising    Intolerance to diabetes medications: No   Prefers do not take injectables.     Weight trend: stable  Prior visit with dietician: yes  Current diet: on average, 5 small meals per day  Current exercise: 3 days a week     Current monitoring regimen: home blood tests - 2 times daily   Has brought blood glucose log/meter:  Yes  Home blood sugar records: fasting range: 130-140 and postprandial range: 120-130  Any episodes of hypoglycemia? No  Hypoglycemia frequency and time(s):  none  Does patient have Glucagon emergency kit? No  Does patient have rapid acting carbohydrate? No  Does patient wear a medic alert bracelet or necklace? No    Mother  from diabetes complications. At age 61. On Lantus and Humalog prior to surgery.      2. Status post gastric surgery  Lost 160 lbs after gastric sleeve surgery. 3.  Overweight  Follows eating instructions as for history of gastric surgery.     4.  Elevated liver function tests  No nausea, vomiting      Narrative   EXAMINATION:   THYROID ULTRASOUND       2019       COMPARISON:   None       HISTORY:   ORDERING SYSTEM PROVIDED HISTORY: Goiter   TECHNOLOGIST PROVIDED HISTORY:   Ordering Physician Provided Reason for Exam: goiter   Acuity: Unknown   Type of Exam: Unknown       FINDINGS:   Right thyroid lobe:  4.3 x 1.6 x 1.4 cm       Left thyroid lobe:  4.6 x 1.3 x 1.5 cm       Isthmus:  2 mm       Thyroid Gland:  Thyroid gland demonstrates normal echotexture and vascularity.       Nodules: No thyroid nodules are present.       Cervical lymphadenopathy: On the right there is a 1.7 x 1.0 x 0.9 cm normal   appearing lymph node.  On the left a pair of normal appearing lymph nodes   measuring 8 x 9 x 3 mm and 8 x 4 x 5 mm are noted.             Past Medical History:   Diagnosis Date    COVID-19 03/2020    Diabetes mellitus ([facility] [address]     Obesity       Patient Active Problem List   Diagnosis    Diabetes mellitus type 2, controlled, without complications ([facility] [address]    Status post gastric surgery    Overweight (BMI 25.0-29. 9)    Uncontrolled type 2 diabetes mellitus with hyperglycemia (HCC)     Past Surgical History:   Procedure Laterality Date    GASTRIC BYPASS SURGERY      NASAL SEPTUM SURGERY       Social History     Socioeconomic History    Marital status:      Spouse name: Not on file    Number of children: Not on file    Years of education: Not on file    Highest education level: Not on file   Occupational History    Not on file   Tobacco Use    Smoking status: Never Smoker    Smokeless tobacco: Never Used   Vaping Use    Vaping Use: Never used   Substance and Sexual Activity    Alcohol use: No    Drug use: No    Sexual activity: Not on file   Other Topics Concern    Not on file   Social History Narrative    Not on file     Social Determinants of Health     Financial Resource Strain:     Difficulty of Paying Living Expenses: Not on file   Food Insecurity:     Worried About Running Out of Food in the Last Year: Not on file    Vannessa of Food in the Last Year: Not on file   Transportation Needs:     Lack of Transportation (Medical): Not on file    Lack of Transportation (Non-Medical):  Not on file   Physical Activity:     Days of Exercise per Week: Not on file    Minutes of Exercise per Session: Not on file   Stress:     Feeling of Stress : Not on file   Social Connections:     Frequency of Communication with Friends and Family: Not on file    Component Value Date    LABA1C 8.3 01/26/2022     Lab Results   Component Value Date    .5 01/26/2022     Lab Results   Component Value Date    CHOL 152 01/26/2022     Lab Results   Component Value Date    TRIG 120 01/26/2022     Lab Results   Component Value Date    HDL 53 01/26/2022     Lab Results   Component Value Date    LDLCALC 75 01/26/2022     Lab Results   Component Value Date    LABVLDL 24 01/26/2022     No results found for: [facility]  Lab Results   Component Value Date    LABMICR <1.20 01/26/2022     No results found for: VITD25     Review of Systems:  Constitutional: no fatigue, no fever, no recent weight gain, no recent weight loss, no changes in appetite  Eyes: no eye pain, no change in vision, no eye redness, no eye irritation, no double vision  Ears, nose, throat: no nasal congestion, no sore throat, no earache, no decrease in hearing, no hoarseness, no dry mouth, no sinus problems, no difficulty swallowing, no neck lumps, no dental problems, no mouth sores, no ringing in ears  Pulmonary: no shortness of breath, no wheezing, no dyspnea on exertion, no cough  Cardiovascular: no chest pain, no lower extremity edema, no orthopnea, no intermittent leg claudication, no palpitations  Gastrointestinal: no abdominal pain, no nausea, no vomiting, no diarrhea, no constipation, no dysphagia, no heartburn, no bloating  Genitourinary: no dysuria, no urinary incontinence, no urinary hesitancy, no urinary frequency, no feelings of urinary urgency, no nocturia  Musculoskeletal: no joint swelling, no joint stiffness, no joint pain, no muscle cramps, no muscle pain, no bone pain  Integument/Breast: no hair loss, no skin rashes, no skin lesions, no itching, no dry skin  Neurological: no numbness, no tingling, no weakness, no confusion, no headaches, no dizziness, no fainting, no tremors, no decrease in memory, no balance problems  Psychiatric: no anxiety, no depression, no insomnia  Hematologic/Lymphatic: no tendency for easy bleeding, no swollen lymph nodes, no tendency for easy bruising  Immunology: no seasonal allergies, no frequent infections, no frequent illnesses  Endocrine: no temperature intolerance, has thirst, frequent urination    BP (!) 148/88   Pulse 75   Temp 98 °F (36.7 °C)   Resp 14   Ht 6' (1.829 m)   Wt 246 lb (111.6 kg)   SpO2 98%   BMI 33.36 kg/m²    Wt Readings from Last 3 Encounters:   01/31/22 246 lb (111.6 kg)   10/24/19 211 lb 12.8 oz (96.1 kg)   06/04/19 208 lb (94.3 kg)     Body mass index is 33.36 kg/m².       OBJECTIVE:  Constitutional: no acute distress, well appearing and well nourished  Psychiatric: oriented to person, place and time, judgement and insight and normal, recent and remote memory and intact and mood and affect are normal  Skin: skin and subcutaneous tissue is normal without mass, normal turgor  Head and Face: examination of head and face revealed no abnormalities  Eyes: no lid or conjunctival swelling, erythema or discharge, pupils are normal, equal, round, reactive to light  Ears/Nose: external inspection of ears and nose revealed no abnormalities, hearing is grossly normal  Oropharynx/Mouth/Face: lips, tongue and gums are normal with no lesions, the voice quality was normal  Neck: neck is supple and symmetric, with midline trachea and no masses, thyroid is normal  Lymphatics: normal cervical lymph nodes, normal supraclavicular nodes  Pulmonary: no increased work of breathing or signs of respiratory distress, lungs are clear to auscultation  Cardiovascular: normal heart rate and rhythm, normal S1 and S2, no murmurs and pedal pulses and 2+ bilaterally, No edema  Abdomen: abdomen is soft, non-tender with no masses  Musculoskeletal: normal gait and station and exam of the digits and nails are normal  Neurological: normal coordination and normal general cortical function    Visual inspection:  Deformity/amputation: absent  Skin lesions/pre-ulcerative calluses: absent  Edema: right- negative, left- negative     Sensory exam:  Monofilament sensation: normal  (minimum of 5 random plantar locations tested, avoiding callused areas - > 1 area with absence of sensation is + for neuropathy)     Plus at least one of the following:  Pulses: normal  Proprioception: Intact  Vibration (128 Hz): Intact    ASSESSMENT/PLAN:    1. Uncontrolled diabetes mellitus type 2 without complications (HCC)  POX3O 15-14-8.9-8.6-6.8-6.7-8.3  LDL 75  Had higher BG during holiday, could not exercise, now back to exercising. Gained weight. Metformin 0974 mg bid, Trulicity 1.5 mg weekly, Jardiance 25 mg daily. Glipizide 5 mg 2 tabs bid. - Comprehensive Metabolic Panel; Future  - Hemoglobin A1C; Future  - Lipid Panel; Future  - Microalbumin / Creatinine Urine Ratio; Future     2. Status post gastric surgery  Diet, exercise.     3. Overweight  Continue diet and exercise. 4.  Elevated liver function tests  ALT 49  AST 30  Diet, exercise    Reviewed and/or ordered clinical lab results Yes  Reviewed and/or ordered radiology tests No  Reviewed and/or ordered other diagnostic tests No  Discussed test results with performing physician No  Independently reviewed image, tracing, or specimen No  Made a decision to obtain old records No  Reviewed and summarized old records Yes  Obtained history from other than patient No    Gisele Lanier was counseled regarding symptoms of diabetes diagnosis, course and complications of disease if inadequately treated, side effects of medications, diagnosis, treatment options, and prognosis, risks, benefits, complications, and alternatives of treatment, labs, imaging and other studies and treatment targets and goals, pathophysiology of diabetes, compliance, risks. He understands instructions and counseling. Return in about 3 months (around 4/30/2022) for diabetes.

## 2022-05-28 DIAGNOSIS — E11.65 UNCONTROLLED TYPE 2 DIABETES MELLITUS WITH HYPERGLYCEMIA (HCC): ICD-10-CM

## 2022-05-28 DIAGNOSIS — Z98.890 STATUS POST GASTRIC SURGERY: ICD-10-CM

## 2022-05-31 RX ORDER — GLIPIZIDE 5 MG/1
TABLET ORAL
Qty: 360 TABLET | Refills: 0 | Status: SHIPPED | OUTPATIENT
Start: 2022-05-31 | End: 2022-10-31

## 2022-07-09 DIAGNOSIS — E11.9 CONTROLLED TYPE 2 DIABETES MELLITUS WITHOUT COMPLICATION, WITHOUT LONG-TERM CURRENT USE OF INSULIN (HCC): ICD-10-CM

## 2022-07-09 DIAGNOSIS — Z98.890 STATUS POST GASTRIC SURGERY: ICD-10-CM

## 2022-07-11 RX ORDER — DULAGLUTIDE 1.5 MG/.5ML
INJECTION, SOLUTION SUBCUTANEOUS
Qty: 2 ML | Refills: 0 | Status: SHIPPED | OUTPATIENT
Start: 2022-07-11 | End: 2022-07-21

## 2022-07-21 ENCOUNTER — TELEPHONE (OUTPATIENT)
Dept: ENDOCRINOLOGY | Age: 52
End: 2022-07-21

## 2022-07-21 DIAGNOSIS — Z98.890 STATUS POST GASTRIC SURGERY: ICD-10-CM

## 2022-07-21 DIAGNOSIS — E11.9 CONTROLLED TYPE 2 DIABETES MELLITUS WITHOUT COMPLICATION, WITHOUT LONG-TERM CURRENT USE OF INSULIN (HCC): ICD-10-CM

## 2022-07-21 RX ORDER — DULAGLUTIDE 1.5 MG/.5ML
INJECTION, SOLUTION SUBCUTANEOUS
Qty: 6 ML | Refills: 0 | Status: SHIPPED | OUTPATIENT
Start: 2022-07-21 | End: 2022-10-10

## 2022-07-21 NOTE — TELEPHONE ENCOUNTER
Pts insurance wont cover a 30 day supply but they will cover a 90 day supply so needs new prescription. Has been out for 2 weeks.   TRULICITY 1.5 TR/7.3JQ SOPN       [id number] - [place], [place] - [address] [place] [address] [place] [place] [facility] [address]   Phone:  [phone]  Fax:  [phone]

## 2022-09-01 DIAGNOSIS — Z98.890 STATUS POST GASTRIC SURGERY: ICD-10-CM

## 2022-09-01 DIAGNOSIS — E11.65 UNCONTROLLED TYPE 2 DIABETES MELLITUS WITH HYPERGLYCEMIA (HCC): ICD-10-CM

## 2022-09-01 RX ORDER — EMPAGLIFLOZIN 25 MG/1
TABLET, FILM COATED ORAL
Qty: 90 TABLET | Refills: 0 | Status: SHIPPED | OUTPATIENT
Start: 2022-09-01 | End: 2022-11-17 | Stop reason: SDUPTHER

## 2022-10-10 DIAGNOSIS — E11.9 CONTROLLED TYPE 2 DIABETES MELLITUS WITHOUT COMPLICATION, WITHOUT LONG-TERM CURRENT USE OF INSULIN (HCC): ICD-10-CM

## 2022-10-10 DIAGNOSIS — Z98.890 STATUS POST GASTRIC SURGERY: ICD-10-CM

## 2022-10-10 RX ORDER — DULAGLUTIDE 1.5 MG/.5ML
INJECTION, SOLUTION SUBCUTANEOUS
Qty: 6 ADJUSTABLE DOSE PRE-FILLED PEN SYRINGE | Refills: 0 | Status: SHIPPED | OUTPATIENT
Start: 2022-10-10 | End: 2022-11-17 | Stop reason: DRUGHIGH

## 2022-10-30 DIAGNOSIS — Z98.890 STATUS POST GASTRIC SURGERY: ICD-10-CM

## 2022-10-30 DIAGNOSIS — E11.65 UNCONTROLLED TYPE 2 DIABETES MELLITUS WITH HYPERGLYCEMIA (HCC): ICD-10-CM

## 2022-10-31 DIAGNOSIS — E11.65 UNCONTROLLED TYPE 2 DIABETES MELLITUS WITH HYPERGLYCEMIA (HCC): ICD-10-CM

## 2022-10-31 DIAGNOSIS — Z98.890 STATUS POST GASTRIC SURGERY: ICD-10-CM

## 2022-10-31 RX ORDER — GLIPIZIDE 5 MG/1
TABLET ORAL
Qty: 120 TABLET | Refills: 0 | Status: SHIPPED | OUTPATIENT
Start: 2022-10-31 | End: 2022-11-17 | Stop reason: SDUPTHER

## 2022-11-11 DIAGNOSIS — R79.89 ELEVATED LIVER FUNCTION TESTS: ICD-10-CM

## 2022-11-11 DIAGNOSIS — E11.65 UNCONTROLLED TYPE 2 DIABETES MELLITUS WITH HYPERGLYCEMIA (HCC): ICD-10-CM

## 2022-11-12 LAB
A/G RATIO: 1.8 (ref 1.1–2.2)
ALBUMIN SERPL-MCNC: 4.8 G/DL (ref 3.4–5)
ALP BLD-CCNC: 73 U/L (ref 40–129)
ALT SERPL-CCNC: 51 U/L (ref 10–40)
ANION GAP SERPL CALCULATED.3IONS-SCNC: 20 MMOL/L (ref 3–16)
AST SERPL-CCNC: 34 U/L (ref 15–37)
BILIRUB SERPL-MCNC: 0.3 MG/DL (ref 0–1)
BUN BLDV-MCNC: 21 MG/DL (ref 7–20)
CALCIUM SERPL-MCNC: 10.2 MG/DL (ref 8.3–10.6)
CHLORIDE BLD-SCNC: 102 MMOL/L (ref 99–110)
CHOLESTEROL, TOTAL: 163 MG/DL (ref 0–199)
CO2: 25 MMOL/L (ref 21–32)
CREAT SERPL-MCNC: 0.9 MG/DL (ref 0.9–1.3)
ESTIMATED AVERAGE GLUCOSE: 211.6 MG/DL
GFR SERPL CREATININE-BSD FRML MDRD: >60 ML/MIN/{1.73_M2}
GLUCOSE BLD-MCNC: 145 MG/DL (ref 70–99)
HBA1C MFR BLD: 9 %
HDLC SERPL-MCNC: 57 MG/DL (ref 40–60)
LDL CHOLESTEROL CALCULATED: 79 MG/DL
POTASSIUM SERPL-SCNC: 4.9 MMOL/L (ref 3.5–5.1)
SODIUM BLD-SCNC: 147 MMOL/L (ref 136–145)
TOTAL PROTEIN: 7.5 G/DL (ref 6.4–8.2)
TRIGL SERPL-MCNC: 133 MG/DL (ref 0–150)
VLDLC SERPL CALC-MCNC: 27 MG/DL

## 2022-11-17 ENCOUNTER — OFFICE VISIT (OUTPATIENT)
Dept: ENDOCRINOLOGY | Age: 52
End: 2022-11-17
Payer: COMMERCIAL

## 2022-11-17 VITALS
SYSTOLIC BLOOD PRESSURE: 163 MMHG | HEART RATE: 68 BPM | WEIGHT: 245 LBS | DIASTOLIC BLOOD PRESSURE: 97 MMHG | RESPIRATION RATE: 14 BRPM | TEMPERATURE: 98 F | BODY MASS INDEX: 33.18 KG/M2 | HEIGHT: 72 IN | OXYGEN SATURATION: 98 %

## 2022-11-17 DIAGNOSIS — E11.65 UNCONTROLLED TYPE 2 DIABETES MELLITUS WITH HYPERGLYCEMIA (HCC): Primary | ICD-10-CM

## 2022-11-17 DIAGNOSIS — R79.89 ELEVATED LIVER FUNCTION TESTS: ICD-10-CM

## 2022-11-17 DIAGNOSIS — E66.9 CLASS 1 OBESITY WITH SERIOUS COMORBIDITY AND BODY MASS INDEX (BMI) OF 33.0 TO 33.9 IN ADULT, UNSPECIFIED OBESITY TYPE: ICD-10-CM

## 2022-11-17 DIAGNOSIS — Z98.890 STATUS POST GASTRIC SURGERY: ICD-10-CM

## 2022-11-17 PROCEDURE — 3046F HEMOGLOBIN A1C LEVEL >9.0%: CPT | Performed by: INTERNAL MEDICINE

## 2022-11-17 PROCEDURE — 3017F COLORECTAL CA SCREEN DOC REV: CPT | Performed by: INTERNAL MEDICINE

## 2022-11-17 PROCEDURE — 1036F TOBACCO NON-USER: CPT | Performed by: INTERNAL MEDICINE

## 2022-11-17 PROCEDURE — 2022F DILAT RTA XM EVC RTNOPTHY: CPT | Performed by: INTERNAL MEDICINE

## 2022-11-17 PROCEDURE — G8484 FLU IMMUNIZE NO ADMIN: HCPCS | Performed by: INTERNAL MEDICINE

## 2022-11-17 PROCEDURE — G8417 CALC BMI ABV UP PARAM F/U: HCPCS | Performed by: INTERNAL MEDICINE

## 2022-11-17 PROCEDURE — G8427 DOCREV CUR MEDS BY ELIG CLIN: HCPCS | Performed by: INTERNAL MEDICINE

## 2022-11-17 PROCEDURE — 99214 OFFICE O/P EST MOD 30 MIN: CPT | Performed by: INTERNAL MEDICINE

## 2022-11-17 RX ORDER — GLIPIZIDE 5 MG/1
TABLET ORAL
Qty: 360 TABLET | Refills: 1 | Status: SHIPPED | OUTPATIENT
Start: 2022-11-17

## 2022-11-17 RX ORDER — DULAGLUTIDE 1.5 MG/.5ML
INJECTION, SOLUTION SUBCUTANEOUS
Qty: 6 ADJUSTABLE DOSE PRE-FILLED PEN SYRINGE | Refills: 1 | Status: CANCELLED | OUTPATIENT
Start: 2022-11-17

## 2022-11-17 RX ORDER — DULAGLUTIDE 3 MG/.5ML
3 INJECTION, SOLUTION SUBCUTANEOUS WEEKLY
Qty: 6 ML | Refills: 1 | Status: SHIPPED | OUTPATIENT
Start: 2022-11-17

## 2022-11-17 NOTE — PROGRESS NOTES
Marie Cavazos is a 50 y.o. male who presents for Type 2 diabetes mellitus. Current symptoms/problems include  hyperglycemia  and are worsening. 1.  Uncontrolled type 2 diabetes mellitus with hyperglycemia ([facility] [E11.65]    Diagnosed with Type 2 diabetes mellitus in . He did not bring his BG record. Comorbid conditions:  none    Current diabetic medications include: Metformin 1000 mg bid, Victoza, Jardiance, glipizide. Had higher BG during holiday, could not exercise, now back to exercising    Intolerance to diabetes medications: No   Prefers do not take injectables. Weight trend: stable  Prior visit with dietician: yes  Current diet: on average, 5 small meals per day  Current exercise: 3 days a week     Current monitoring regimen: home blood tests - 2 times daily   Has brought blood glucose log/meter:  Yes  Home blood sugar records: fasting range: 130-140 and postprandial range: 120-130  Any episodes of hypoglycemia? No  Hypoglycemia frequency and time(s):  none  Does patient have Glucagon emergency kit? No  Does patient have rapid acting carbohydrate? No  Does patient wear a medic alert bracelet or necklace? No    Mother  from diabetes complications. At age 61. On Lantus and Humalog prior to surgery. 2. Status post gastric surgery  Lost 160 lbs after gastric sleeve surgery. 3.  Obesity  Follows eating instructions as for history of gastric surgery.     4.  Elevated liver function tests  No nausea, vomiting      Narrative   EXAMINATION:   THYROID ULTRASOUND       2019       COMPARISON:   None       HISTORY:   ORDERING SYSTEM PROVIDED HISTORY: Goiter   TECHNOLOGIST PROVIDED HISTORY:   Ordering Physician Provided Reason for Exam: goiter   Acuity: Unknown   Type of Exam: Unknown       FINDINGS:   Right thyroid lobe:  4.3 x 1.6 x 1.4 cm       Left thyroid lobe:  4.6 x 1.3 x 1.5 cm       Isthmus:  2 mm       Thyroid Gland:  Thyroid gland demonstrates normal echotexture and vascularity. Nodules: No thyroid nodules are present. Cervical lymphadenopathy: On the right there is a 1.7 x 1.0 x 0.9 cm normal   appearing lymph node. On the left a pair of normal appearing lymph nodes   measuring 8 x 9 x 3 mm and 8 x 4 x 5 mm are noted. Past Medical History:   Diagnosis Date    COVID-19 03/2020    Diabetes mellitus ([facility]     Obesity       Patient Active Problem List   Diagnosis    Diabetes mellitus type 2, controlled, without complications ([facility]    Status post gastric surgery    Overweight (BMI 25.0-29. 9)    Uncontrolled type 2 diabetes mellitus with hyperglycemia (HCC)    Elevated liver function tests    Class 1 obesity with body mass index (BMI) of 33.0 to 33.9 in adult     Past Surgical History:   Procedure Laterality Date    GASTRIC BYPASS SURGERY      NASAL SEPTUM SURGERY       Social History     Socioeconomic History    Marital status:      Spouse name: Not on file    Number of children: Not on file    Years of education: Not on file    Highest education level: Not on file   Occupational History    Not on file   Tobacco Use    Smoking status: Never    Smokeless tobacco: Never   Vaping Use    Vaping Use: Never used   Substance and Sexual Activity    Alcohol use: No    Drug use: No    Sexual activity: Not on file   Other Topics Concern    Not on file   Social History Narrative    Not on file     Social Determinants of Health     Financial Resource Strain: Not on file   Food Insecurity: Not on file   Transportation Needs: Not on file   Physical Activity: Not on file   Stress: Not on file   Social Connections: Not on file   Intimate Partner Violence: Not on file   Housing Stability: Not on file     Family History   Problem Relation Age of Onset    Diabetes Mother     Diabetes Brother      Current Outpatient Medications   Medication Sig Dispense Refill    glipiZIDE (GLUCOTROL) 5 MG tablet TAKE 2 TABLETS BY MOUTH TWICE A  tablet 1    empagliflozin (JARDIANCE) 25 MG tablet TAKE 1 TABLET BY MOUTH EVERY DAY 90 tablet 1    metFORMIN (GLUCOPHAGE) 1000 MG tablet TAKE 1 TABLET BY MOUTH TWICE A DAY WITH MEALS 180 tablet 1    Dulaglutide (TRULICITY) 3 EA/5.4IQ SOPN Inject 3 mg into the skin once a week 6 mL 1     No current facility-administered medications for this visit.      Allergies   Allergen Reactions    Pcn [Penicillins]      Family Status   Relation Name Status    Mother      Brother  Alive       Lab Review:    No results found for: WBC, HGB, HCT, MCV, PLT  Lab Results   Component Value Date/Time     2022 12:18 PM    K 4.9 2022 12:18 PM     2022 12:18 PM    CO2 25 2022 12:18 PM    BUN 21 2022 12:18 PM    CREATININE 0.9 2022 12:18 PM    GLUCOSE 145 2022 12:18 PM    CALCIUM 10.2 2022 12:18 PM    PROT 7.5 2022 12:18 PM    LABALBU 4.8 2022 12:18 PM    BILITOT 0.3 2022 12:18 PM    ALKPHOS 73 2022 12:18 PM    AST 34 2022 12:18 PM    ALT 51 2022 12:18 PM    LABGLOM >60 2022 12:18 PM    GFRAA >60 2022 07:46 AM    AGRATIO 1.8 2022 12:18 PM    GLOB 2.7 2020 07:14 AM     Lab Results   Component Value Date/Time    TSH 1.81 2022 07:46 AM     Lab Results   Component Value Date/Time    LABA1C 9.0 2022 12:18 PM     Lab Results   Component Value Date/Time    .6 2022 12:18 PM     Lab Results   Component Value Date/Time    CHOL 163 2022 12:18 PM     Lab Results   Component Value Date/Time    TRIG 133 2022 12:18 PM     Lab Results   Component Value Date/Time    HDL 57 2022 12:18 PM     Lab Results   Component Value Date/Time    LDLCALC 79 2022 12:18 PM     Lab Results   Component Value Date/Time    LABVLDL 27 2022 12:18 PM     No results found for: CHOLHDLRATIO  Lab Results   Component Value Date/Time    LABMICR <1.20 2022 07:46 AM     No results found for: VITD25     Review of Systems:  Constitutional: no fatigue, no fever, no recent weight gain, no recent weight loss, no changes in appetite  Eyes: no eye pain, no change in vision, no eye redness, no eye irritation, no double vision  Ears, nose, throat: no nasal congestion, no sore throat, no earache, no decrease in hearing, no hoarseness, no dry mouth, no sinus problems, no difficulty swallowing, no neck lumps, no dental problems, no mouth sores, no ringing in ears  Pulmonary: no shortness of breath, no wheezing, no dyspnea on exertion, no cough  Cardiovascular: no chest pain, no lower extremity edema, no orthopnea, no intermittent leg claudication, no palpitations  Gastrointestinal: no abdominal pain, no nausea, no vomiting, no diarrhea, no constipation, no dysphagia, no heartburn, no bloating  Genitourinary: no dysuria, no urinary incontinence, no urinary hesitancy, no urinary frequency, no feelings of urinary urgency, no nocturia  Musculoskeletal: no joint swelling, no joint stiffness, no joint pain, no muscle cramps, no muscle pain, no bone pain  Integument/Breast: no hair loss, no skin rashes, no skin lesions, no itching, no dry skin  Neurological: no numbness, no tingling, no weakness, no confusion, no headaches, no dizziness, no fainting, no tremors, no decrease in memory, no balance problems  Psychiatric: no anxiety, no depression, no insomnia  Hematologic/Lymphatic: no tendency for easy bleeding, no swollen lymph nodes, no tendency for easy bruising  Immunology: no seasonal allergies, no frequent infections, no frequent illnesses  Endocrine: no temperature intolerance, has thirst, frequent urination    BP (!) 163/97   Pulse 68   Temp 98 °F (36.7 °C)   Resp 14   Ht 6' (1.829 m)   Wt 245 lb (111.1 kg)   SpO2 98%   BMI 33.23 kg/m²    Wt Readings from Last 3 Encounters:   11/17/22 245 lb (111.1 kg)   01/31/22 246 lb (111.6 kg)   10/24/19 211 lb 12.8 oz (96.1 kg)     Body mass index is 33.23 kg/m². OBJECTIVE:  Constitutional: no acute distress, well appearing and well nourished  Psychiatric: oriented to person, place and time, judgement and insight and normal, recent and remote memory and intact and mood and affect are normal  Skin: skin and subcutaneous tissue is normal without mass, normal turgor  Head and Face: examination of head and face revealed no abnormalities  Eyes: no lid or conjunctival swelling, erythema or discharge, pupils are normal, equal, round, reactive to light  Ears/Nose: external inspection of ears and nose revealed no abnormalities, hearing is grossly normal  Oropharynx/Mouth/Face: lips, tongue and gums are normal with no lesions, the voice quality was normal  Neck: neck is supple and symmetric, with midline trachea and no masses, thyroid is normal  Lymphatics: normal cervical lymph nodes, normal supraclavicular nodes  Pulmonary: no increased work of breathing or signs of respiratory distress, lungs are clear to auscultation  Cardiovascular: normal heart rate and rhythm, normal S1 and S2, no murmurs and pedal pulses and 2+ bilaterally, No edema  Abdomen: abdomen is soft, non-tender with no masses  Musculoskeletal: normal gait and station and exam of the digits and nails are normal  Neurological: normal coordination and normal general cortical function    Visual inspection:  Deformity/amputation: absent  Skin lesions/pre-ulcerative calluses: absent  Edema: right- negative, left- negative     Sensory exam:  Monofilament sensation: normal  (minimum of 5 random plantar locations tested, avoiding callused areas - > 1 area with absence of sensation is + for neuropathy)     Plus at least one of the following:  Pulses: normal  Proprioception: Intact  Vibration (128 Hz): Intact    ASSESSMENT/PLAN:    1. Uncontrolled diabetes mellitus type 2 with hyperglycemia (HCC)  HbA1C 15-14-8.9-8.6-6.8-6.7-8.3-9.0  LDL 75-79  Had higher BG during Covid, now back to exercising.   Gained weight. Metformin 5852 mg bid, Trulicity 3.0 mg weekly, Jardiance 25 mg daily. Glipizide 5 mg 2 tabs bid. - Comprehensive Metabolic Panel; Future  - Hemoglobin A1C; Future  - Lipid Panel; Future  - Microalbumin / Creatinine Urine Ratio; Future     2. Status post gastric surgery  Diet, exercise. 3.  Overweight  Continue diet and exercise. 4.  Elevated liver function tests  ALT 49-51  AST 30-34  Diet, exercise    Reviewed and/or ordered clinical lab results Yes  Reviewed and/or ordered radiology tests No  Reviewed and/or ordered other diagnostic tests No  Discussed test results with performing physician No  Independently reviewed image, tracing, or specimen No  Made a decision to obtain old records No  Reviewed and summarized old records Yes  Obtained history from other than patient No    Gisele Butterfield was counseled regarding symptoms of diabetes diagnosis, course and complications of disease if inadequately treated, side effects of medications, diagnosis, treatment options, and prognosis, risks, benefits, complications, and alternatives of treatment, labs, imaging and other studies and treatment targets and goals, pathophysiology of diabetes, compliance, risks. He understands instructions and counseling. Return in about 3 months (around 2/17/2023) for diabetes.

## 2023-02-10 ENCOUNTER — TELEPHONE (OUTPATIENT)
Dept: ENDOCRINOLOGY | Age: 53
End: 2023-02-10

## 2023-02-10 DIAGNOSIS — E11.65 UNCONTROLLED TYPE 2 DIABETES MELLITUS WITH HYPERGLYCEMIA (HCC): ICD-10-CM

## 2023-02-10 DIAGNOSIS — Z98.890 STATUS POST GASTRIC SURGERY: ICD-10-CM

## 2023-02-10 RX ORDER — DULAGLUTIDE 3 MG/.5ML
3 INJECTION, SOLUTION SUBCUTANEOUS WEEKLY
Qty: 6 ML | Refills: 0 | Status: SHIPPED | OUTPATIENT
Start: 2023-02-10

## 2023-02-10 NOTE — TELEPHONE ENCOUNTER
Patient called requesting a refill     Rx- empagliflozin (JARDIANCE) 25 MG tablet     metFORMIN (GLUCOPHAGE) 1000 MG tablet     Dulaglutide (TRULICITY) 3 UY/5.0TM      [address]     LOV- 11/17/22  NOV- 3/13/23    Please advise

## 2023-06-03 DIAGNOSIS — E11.65 UNCONTROLLED TYPE 2 DIABETES MELLITUS WITH HYPERGLYCEMIA (HCC): ICD-10-CM

## 2023-06-05 DIAGNOSIS — Z98.890 STATUS POST GASTRIC SURGERY: ICD-10-CM

## 2023-06-05 DIAGNOSIS — E11.65 UNCONTROLLED TYPE 2 DIABETES MELLITUS WITH HYPERGLYCEMIA (HCC): ICD-10-CM

## 2023-06-05 RX ORDER — DULAGLUTIDE 3 MG/.5ML
INJECTION, SOLUTION SUBCUTANEOUS
Qty: 6 ML | Refills: 0 | Status: SHIPPED | OUTPATIENT
Start: 2023-06-05

## 2023-08-01 ENCOUNTER — TELEPHONE (OUTPATIENT)
Dept: ENDOCRINOLOGY | Age: 53
End: 2023-08-01

## 2023-08-01 DIAGNOSIS — Z98.890 STATUS POST GASTRIC SURGERY: ICD-10-CM

## 2023-08-01 DIAGNOSIS — E11.65 UNCONTROLLED TYPE 2 DIABETES MELLITUS WITH HYPERGLYCEMIA (HCC): ICD-10-CM

## 2023-08-01 RX ORDER — GLIPIZIDE 5 MG/1
TABLET ORAL
Qty: 360 TABLET | Refills: 0 | Status: SHIPPED | OUTPATIENT
Start: 2023-08-01

## 2023-08-01 NOTE — TELEPHONE ENCOUNTER
Pt needs refill of:    glipiZIDE (GLUCOTROL) 5 MG tablet       Sent to:    [facility] [address] [facility],[address], [address] [facility] - P [phone] - F [phone]   [address] [place] [id number]   Phone:  [phone]  Fax:  [phone]

## 2023-09-11 DIAGNOSIS — Z98.890 STATUS POST GASTRIC SURGERY: ICD-10-CM

## 2023-09-11 DIAGNOSIS — E11.65 UNCONTROLLED TYPE 2 DIABETES MELLITUS WITH HYPERGLYCEMIA (HCC): ICD-10-CM

## 2023-09-11 RX ORDER — DULAGLUTIDE 3 MG/.5ML
INJECTION, SOLUTION SUBCUTANEOUS
Qty: 6 ML | Refills: 0 | Status: SHIPPED | OUTPATIENT
Start: 2023-09-11

## 2023-09-11 NOTE — TELEPHONE ENCOUNTER
Pt calling and states needs refill on his Jardiance & Trulicity. [facility] to [facility] on [address] [facility].  He

## 2023-09-26 DIAGNOSIS — E11.65 UNCONTROLLED TYPE 2 DIABETES MELLITUS WITH HYPERGLYCEMIA (HCC): ICD-10-CM

## 2023-09-26 DIAGNOSIS — Z98.890 STATUS POST GASTRIC SURGERY: ICD-10-CM

## 2023-09-26 DIAGNOSIS — R79.89 ELEVATED LIVER FUNCTION TESTS: ICD-10-CM

## 2023-09-26 LAB
ALBUMIN SERPL-MCNC: 4.6 G/DL (ref 3.4–5)
ALBUMIN/GLOB SERPL: 1.8 {RATIO} (ref 1.1–2.2)
ALP SERPL-CCNC: 61 U/L (ref 40–129)
ALT SERPL-CCNC: 38 U/L (ref 10–40)
ANION GAP SERPL CALCULATED.3IONS-SCNC: 9 MMOL/L (ref 3–16)
AST SERPL-CCNC: 26 U/L (ref 15–37)
BILIRUB SERPL-MCNC: 0.6 MG/DL (ref 0–1)
BUN SERPL-MCNC: 14 MG/DL (ref 7–20)
CALCIUM SERPL-MCNC: 9.2 MG/DL (ref 8.3–10.6)
CHLORIDE SERPL-SCNC: 100 MMOL/L (ref 99–110)
CHOLEST SERPL-MCNC: 173 MG/DL (ref 0–199)
CO2 SERPL-SCNC: 31 MMOL/L (ref 21–32)
CREAT SERPL-MCNC: 0.8 MG/DL (ref 0.9–1.3)
GFR SERPLBLD CREATININE-BSD FMLA CKD-EPI: >60 ML/MIN/{1.73_M2}
GLUCOSE SERPL-MCNC: 172 MG/DL (ref 70–99)
HDLC SERPL-MCNC: 60 MG/DL (ref 40–60)
LDL CHOLESTEROL CALCULATED: 91 MG/DL
POTASSIUM SERPL-SCNC: 4.5 MMOL/L (ref 3.5–5.1)
PROT SERPL-MCNC: 7.1 G/DL (ref 6.4–8.2)
SODIUM SERPL-SCNC: 140 MMOL/L (ref 136–145)
TRIGL SERPL-MCNC: 111 MG/DL (ref 0–150)
VLDLC SERPL CALC-MCNC: 22 MG/DL

## 2023-09-27 LAB
CREAT UR-MCNC: 87.6 MG/DL (ref 39–259)
EST. AVERAGE GLUCOSE BLD GHB EST-MCNC: 214.5 MG/DL
HBA1C MFR BLD: 9.1 %
MICROALBUMIN UR DL<=1MG/L-MCNC: <1.2 MG/DL
MICROALBUMIN/CREAT UR: NORMAL MG/G (ref 0–30)

## 2023-10-02 ENCOUNTER — OFFICE VISIT (OUTPATIENT)
Dept: ENDOCRINOLOGY | Age: 53
End: 2023-10-02
Payer: COMMERCIAL

## 2023-10-02 VITALS
TEMPERATURE: 98 F | RESPIRATION RATE: 14 BRPM | WEIGHT: 240 LBS | BODY MASS INDEX: 32.51 KG/M2 | HEIGHT: 72 IN | DIASTOLIC BLOOD PRESSURE: 111 MMHG | SYSTOLIC BLOOD PRESSURE: 179 MMHG | HEART RATE: 76 BPM | OXYGEN SATURATION: 98 %

## 2023-10-02 DIAGNOSIS — Z98.890 STATUS POST GASTRIC SURGERY: ICD-10-CM

## 2023-10-02 DIAGNOSIS — E66.9 CLASS 1 OBESITY WITH SERIOUS COMORBIDITY AND BODY MASS INDEX (BMI) OF 32.0 TO 32.9 IN ADULT, UNSPECIFIED OBESITY TYPE: ICD-10-CM

## 2023-10-02 DIAGNOSIS — E11.65 POORLY CONTROLLED TYPE 2 DIABETES MELLITUS WITH NEUROPATHY (HCC): Primary | ICD-10-CM

## 2023-10-02 DIAGNOSIS — E11.40 POORLY CONTROLLED TYPE 2 DIABETES MELLITUS WITH NEUROPATHY (HCC): Primary | ICD-10-CM

## 2023-10-02 DIAGNOSIS — R79.89 ELEVATED LIVER FUNCTION TESTS: ICD-10-CM

## 2023-10-02 PROCEDURE — 99214 OFFICE O/P EST MOD 30 MIN: CPT | Performed by: INTERNAL MEDICINE

## 2023-10-02 PROCEDURE — 3046F HEMOGLOBIN A1C LEVEL >9.0%: CPT | Performed by: INTERNAL MEDICINE

## 2023-10-02 RX ORDER — DULAGLUTIDE 3 MG/.5ML
INJECTION, SOLUTION SUBCUTANEOUS
Qty: 6 ML | Refills: 1 | Status: CANCELLED | OUTPATIENT
Start: 2023-10-02

## 2023-10-02 RX ORDER — GLIPIZIDE 5 MG/1
TABLET ORAL
Qty: 360 TABLET | Refills: 1 | Status: SHIPPED | OUTPATIENT
Start: 2023-10-02

## 2023-10-02 RX ORDER — DULAGLUTIDE 4.5 MG/.5ML
4.5 INJECTION, SOLUTION SUBCUTANEOUS WEEKLY
Qty: 6 ML | Refills: 1 | Status: SHIPPED | OUTPATIENT
Start: 2023-10-02

## 2023-10-02 NOTE — PROGRESS NOTES
Dayan Allen is a 48 y.o. male who presents for Type 2 diabetes mellitus. Current symptoms/problems include  hyperglycemia  and are worsening. 1.  Poorly controlled type 2 diabetes mellitus with neuropathy ([address]) [E11.40, E11.65]    Diagnosed with Type 2 diabetes mellitus in . He did not bring his BG record. Comorbid conditions:  none    Current diabetic medications include: Metformin 1000 mg bid, Victoza, Jardiance, glipizide. Had higher BG during holiday, could not exercise, now back to exercising    Intolerance to diabetes medications: No   Prefers do not take injectables. Weight trend: stable  Prior visit with dietician: yes  Current diet: on average, 5 small meals per day  Current exercise: 3 days a week     Current monitoring regimen: home blood tests - 2 times daily   Has brought blood glucose log/meter:  Yes  Home blood sugar records: fasting range: 130-125 and postprandial range: 140  Any episodes of hypoglycemia? No  Hypoglycemia frequency and time(s):  none  Does patient have Glucagon emergency kit? No  Does patient have rapid acting carbohydrate? No  Does patient wear a medic alert bracelet or necklace? No    Mother  from diabetes complications. At age 61. On Lantus and Humalog prior to surgery. 2. Status post gastric surgery  Lost 160 lbs after gastric sleeve surgery. 3.  Obesity  Follows eating instructions as for history of gastric surgery.     4.  Elevated liver function tests  No nausea, vomiting      Narrative   EXAMINATION:   THYROID ULTRASOUND       2019       COMPARISON:   None       HISTORY:   ORDERING SYSTEM PROVIDED HISTORY: Goiter   TECHNOLOGIST PROVIDED HISTORY:   Ordering Physician Provided Reason for Exam: goiter   Acuity: Unknown   Type of Exam: Unknown       FINDINGS:   Right thyroid lobe:  4.3 x 1.6 x 1.4 cm       Left thyroid lobe:  4.6 x 1.3 x 1.5 cm       Isthmus:  2 mm       Thyroid Gland:  Thyroid gland demonstrates normal echotexture and

## 2023-10-12 ENCOUNTER — TELEPHONE (OUTPATIENT)
Dept: ENDOCRINOLOGY | Age: 53
End: 2023-10-12

## 2023-10-12 NOTE — TELEPHONE ENCOUNTER
Pt called in and said his Monique Arkoe that was given to him as a sample which he really likes fell off today after his shower and he leaves for a business trip this afternoon and would like another sample. Please call pt and let him know if he can stop in for another sample.

## 2024-02-19 ENCOUNTER — TELEPHONE (OUTPATIENT)
Dept: ENDOCRINOLOGY | Age: 54
End: 2024-02-19

## 2024-02-19 DIAGNOSIS — E11.40 POORLY CONTROLLED TYPE 2 DIABETES MELLITUS WITH NEUROPATHY (HCC): Primary | ICD-10-CM

## 2024-02-19 DIAGNOSIS — E11.65 POORLY CONTROLLED TYPE 2 DIABETES MELLITUS WITH NEUROPATHY (HCC): Primary | ICD-10-CM

## 2024-02-19 NOTE — TELEPHONE ENCOUNTER
Call from pt wanting to know if Dr. Ferrera could place a referral in his chart for [facility] endocrinology

## 2024-02-19 NOTE — TELEPHONE ENCOUNTER
External referral placed. Pt wanted it faxed to [phone]. Efaxed referral w/ chart notes and labs.

## 2024-04-05 DIAGNOSIS — E11.65 POORLY CONTROLLED TYPE 2 DIABETES MELLITUS WITH NEUROPATHY (HCC): ICD-10-CM

## 2024-04-05 DIAGNOSIS — E11.40 POORLY CONTROLLED TYPE 2 DIABETES MELLITUS WITH NEUROPATHY (HCC): ICD-10-CM

## 2024-04-08 RX ORDER — GLIPIZIDE 5 MG/1
TABLET ORAL
Qty: 360 TABLET | Refills: 1 | Status: SHIPPED | OUTPATIENT
Start: 2024-04-08

## 2024-04-08 NOTE — TELEPHONE ENCOUNTER
Medication:   Requested Prescriptions     Pending Prescriptions Disp Refills    empagliflozin (JARDIANCE) 25 MG tablet [Pharmacy Med Name: JARDIANCE 25MG TABLETS] 90 tablet 1     Sig: TAKE 1 TABLET BY MOUTH EVERY DAY    glipiZIDE (GLUCOTROL) 5 MG tablet [Pharmacy Med Name: GLIPIZIDE 5MG TABLETS] 360 tablet 1     Sig: TAKE 2 TABLETS BY MOUTH TWICE DAILY    metFORMIN (GLUCOPHAGE) 1000 MG tablet [Pharmacy Med Name: METFORMIN 1000MG TABLETS] 180 tablet 1     Sig: TAKE 1 TABLET BY MOUTH TWICE DAILY WITH MEALS       Last Filled:      Patient Phone Number: [phone] (home)     Last appt: 10/2/2023   Next appt: 4/25/2024    Last Labs DM:   Lab Results   Component Value Date/Time    LABA1C 9.1 09/26/2023 07:45 AM

## 2024-04-24 PROBLEM — E66.9 CLASS 1 OBESITY WITH SERIOUS COMORBIDITY AND BODY MASS INDEX (BMI) OF 32.0 TO 32.9 IN ADULT: Status: ACTIVE | Noted: 2024-04-24

## 2024-11-18 ENCOUNTER — APPOINTMENT (RX ONLY)
Dept: URBAN - METROPOLITAN AREA CLINIC 170 | Facility: CLINIC | Age: 54
Setting detail: DERMATOLOGY
End: 2024-11-18

## 2024-11-18 DIAGNOSIS — L81.4 OTHER MELANIN HYPERPIGMENTATION: ICD-10-CM | Status: STABLE

## 2024-11-18 DIAGNOSIS — D18.0 HEMANGIOMA: ICD-10-CM | Status: STABLE

## 2024-11-18 DIAGNOSIS — L57.0 ACTINIC KERATOSIS: ICD-10-CM | Status: INADEQUATELY CONTROLLED

## 2024-11-18 PROBLEM — D18.01 HEMANGIOMA OF SKIN AND SUBCUTANEOUS TISSUE: Status: ACTIVE | Noted: 2024-11-18

## 2024-11-18 PROCEDURE — ? LIQUID NITROGEN

## 2024-11-18 PROCEDURE — 17003 DESTRUCT PREMALG LES 2-14: CPT

## 2024-11-18 PROCEDURE — ? FULL BODY SKIN EXAM - DECLINED

## 2024-11-18 PROCEDURE — ? COUNSELING

## 2024-11-18 PROCEDURE — 17000 DESTRUCT PREMALG LESION: CPT

## 2024-11-18 PROCEDURE — ? TREATMENT REGIMEN

## 2024-11-18 PROCEDURE — 99203 OFFICE O/P NEW LOW 30 MIN: CPT | Mod: 25

## 2024-11-18 ASSESSMENT — LOCATION SIMPLE DESCRIPTION DERM
LOCATION SIMPLE: RIGHT FOREHEAD
LOCATION SIMPLE: LEFT CHEEK

## 2024-11-18 ASSESSMENT — LOCATION ZONE DERM: LOCATION ZONE: FACE

## 2024-11-18 ASSESSMENT — LOCATION DETAILED DESCRIPTION DERM
LOCATION DETAILED: RIGHT MEDIAL FOREHEAD
LOCATION DETAILED: LEFT SUPERIOR CENTRAL MALAR CHEEK
LOCATION DETAILED: LEFT INFERIOR CENTRAL MALAR CHEEK

## 2024-11-18 NOTE — PROCEDURE: LIQUID NITROGEN
Render Note In Bullet Format When Appropriate: No
Number Of Freeze-Thaw Cycles: 2 freeze-thaw cycles
Detail Level: Detailed
Show Aperture Variable?: Yes
Consent: The patient's consent was obtained including but not limited to risks of crusting, scabbing, blistering, scarring, darker or lighter pigmentary change, recurrence, incomplete removal and infection.
Duration Of Freeze Thaw-Cycle (Seconds): 0
Post-Care Instructions: I reviewed with the patient in detail post-care instructions. Patient is to wear sunprotection, and avoid picking at any of the treated lesions. Pt may apply Vaseline to crusted or scabbing areas.

## 2024-11-18 NOTE — HPI: SKIN LESION
What Type Of Note Output Would You Prefer (Optional)?: Bullet Format
How Severe Is Your Skin Lesion?: mild
Has Your Skin Lesion Been Treated?: not been treated
Is This A New Presentation, Or A Follow-Up?: Skin Lesion
Additional History: Two spots on the left cheek that he picks at with tweezers to peel off the dry skin, has had them treated in the past but does not know where. Stated they have been frozen and the one was biopsied but the results were not given to him, assumed no new was good news. Here for further evaluation

## 2025-03-18 ENCOUNTER — APPOINTMENT (OUTPATIENT)
Dept: URBAN - METROPOLITAN AREA CLINIC 170 | Facility: CLINIC | Age: 55
Setting detail: DERMATOLOGY
End: 2025-03-18

## 2025-03-18 DIAGNOSIS — L82.1 OTHER SEBORRHEIC KERATOSIS: ICD-10-CM | Status: STABLE

## 2025-03-18 DIAGNOSIS — L57.0 ACTINIC KERATOSIS: ICD-10-CM | Status: INADEQUATELY CONTROLLED

## 2025-03-18 DIAGNOSIS — L81.4 OTHER MELANIN HYPERPIGMENTATION: ICD-10-CM | Status: STABLE

## 2025-03-18 DIAGNOSIS — D18.0 HEMANGIOMA: ICD-10-CM | Status: STABLE

## 2025-03-18 DIAGNOSIS — D22 MELANOCYTIC NEVI: ICD-10-CM | Status: STABLE

## 2025-03-18 PROBLEM — D18.01 HEMANGIOMA OF SKIN AND SUBCUTANEOUS TISSUE: Status: ACTIVE | Noted: 2025-03-18

## 2025-03-18 PROBLEM — D22.5 MELANOCYTIC NEVI OF TRUNK: Status: ACTIVE | Noted: 2025-03-18

## 2025-03-18 PROCEDURE — ? FULL BODY SKIN EXAM

## 2025-03-18 PROCEDURE — 99213 OFFICE O/P EST LOW 20 MIN: CPT | Mod: 25

## 2025-03-18 PROCEDURE — 17000 DESTRUCT PREMALG LESION: CPT

## 2025-03-18 PROCEDURE — ? TREATMENT REGIMEN

## 2025-03-18 PROCEDURE — 17003 DESTRUCT PREMALG LES 2-14: CPT

## 2025-03-18 PROCEDURE — ? COUNSELING

## 2025-03-18 PROCEDURE — ? LIQUID NITROGEN

## 2025-03-18 ASSESSMENT — LOCATION ZONE DERM
LOCATION ZONE: TRUNK
LOCATION ZONE: FACE

## 2025-03-18 ASSESSMENT — LOCATION SIMPLE DESCRIPTION DERM
LOCATION SIMPLE: RIGHT FOREHEAD
LOCATION SIMPLE: RIGHT UPPER BACK
LOCATION SIMPLE: UPPER BACK
LOCATION SIMPLE: RIGHT LOWER BACK
LOCATION SIMPLE: ABDOMEN
LOCATION SIMPLE: LEFT CHEEK
LOCATION SIMPLE: LEFT FOREHEAD

## 2025-03-18 ASSESSMENT — LOCATION DETAILED DESCRIPTION DERM
LOCATION DETAILED: INFERIOR THORACIC SPINE
LOCATION DETAILED: RIGHT SUPERIOR UPPER BACK
LOCATION DETAILED: EPIGASTRIC SKIN
LOCATION DETAILED: RIGHT INFERIOR MEDIAL MIDBACK
LOCATION DETAILED: LEFT CENTRAL MALAR CHEEK
LOCATION DETAILED: RIGHT FOREHEAD
LOCATION DETAILED: LEFT FOREHEAD

## 2025-03-18 NOTE — PROCEDURE: LIQUID NITROGEN
Detail Level: Detailed
Render Post-Care Instructions In Note?: yes
Number Of Freeze-Thaw Cycles: 2 freeze-thaw cycles
Duration Of Freeze Thaw-Cycle (Seconds): 0
Post-Care Instructions: I reviewed with the patient in detail post-care instructions. Patient is to wear sunprotection, and avoid picking at any of the treated lesions. Pt may apply Vaseline to crusted or scabbing areas.
Render Note In Bullet Format When Appropriate: No
Consent: The patient's consent was obtained including but not limited to risks of crusting, scabbing, blistering, scarring, darker or lighter pigmentary change, recurrence, incomplete removal and infection.

## 2025-03-18 NOTE — HPI: EVALUATION OF SKIN LESION(S)
What Type Of Note Output Would You Prefer (Optional)?: Bullet Format
Hpi Title: Evaluation of Skin Lesions
Additional History: Recheck left cheek, s/p ln2 in past